# Patient Record
Sex: MALE | Race: WHITE | NOT HISPANIC OR LATINO | Employment: FULL TIME | URBAN - METROPOLITAN AREA
[De-identification: names, ages, dates, MRNs, and addresses within clinical notes are randomized per-mention and may not be internally consistent; named-entity substitution may affect disease eponyms.]

---

## 2017-01-12 ENCOUNTER — PATIENT MESSAGE (OUTPATIENT)
Dept: FAMILY MEDICINE | Facility: CLINIC | Age: 62
End: 2017-01-12

## 2017-03-06 ENCOUNTER — TELEPHONE (OUTPATIENT)
Dept: FAMILY MEDICINE | Facility: CLINIC | Age: 62
End: 2017-03-06

## 2017-03-06 ENCOUNTER — LAB VISIT (OUTPATIENT)
Dept: LAB | Facility: HOSPITAL | Age: 62
End: 2017-03-06
Attending: EMERGENCY MEDICINE
Payer: COMMERCIAL

## 2017-03-06 DIAGNOSIS — Z00.00 PREVENTATIVE HEALTH CARE: Primary | ICD-10-CM

## 2017-03-06 DIAGNOSIS — Z00.00 PREVENTATIVE HEALTH CARE: ICD-10-CM

## 2017-03-06 LAB
ALBUMIN SERPL BCP-MCNC: 4 G/DL
ALP SERPL-CCNC: 72 U/L
ALT SERPL W/O P-5'-P-CCNC: 40 U/L
ANION GAP SERPL CALC-SCNC: 8 MMOL/L
AST SERPL-CCNC: 26 U/L
BILIRUB SERPL-MCNC: 0.7 MG/DL
BUN SERPL-MCNC: 15 MG/DL
CALCIUM SERPL-MCNC: 9.2 MG/DL
CHLORIDE SERPL-SCNC: 102 MMOL/L
CHOLEST/HDLC SERPL: 5.3 {RATIO}
CO2 SERPL-SCNC: 27 MMOL/L
COMPLEXED PSA SERPL-MCNC: 1.4 NG/ML
CREAT SERPL-MCNC: 1 MG/DL
EST. GFR  (AFRICAN AMERICAN): >60 ML/MIN/1.73 M^2
EST. GFR  (NON AFRICAN AMERICAN): >60 ML/MIN/1.73 M^2
GLUCOSE SERPL-MCNC: 94 MG/DL
HDL/CHOLESTEROL RATIO: 19 %
HDLC SERPL-MCNC: 253 MG/DL
HDLC SERPL-MCNC: 48 MG/DL
LDLC SERPL CALC-MCNC: 172.2 MG/DL
NONHDLC SERPL-MCNC: 205 MG/DL
POTASSIUM SERPL-SCNC: 4 MMOL/L
PROT SERPL-MCNC: 7.4 G/DL
SODIUM SERPL-SCNC: 137 MMOL/L
TRIGL SERPL-MCNC: 164 MG/DL

## 2017-03-06 PROCEDURE — 80053 COMPREHEN METABOLIC PANEL: CPT

## 2017-03-06 PROCEDURE — 80061 LIPID PANEL: CPT

## 2017-03-06 PROCEDURE — 36415 COLL VENOUS BLD VENIPUNCTURE: CPT | Mod: PO

## 2017-03-06 PROCEDURE — 84153 ASSAY OF PSA TOTAL: CPT

## 2017-03-06 NOTE — TELEPHONE ENCOUNTER
----- Message from Emily Grande sent at 3/6/2017  2:07 PM CST -----  Patient states that he has an appointment/annual scheduled for tomorrow and need orders for blood work.  Please call patient at [phone].

## 2017-03-06 NOTE — TELEPHONE ENCOUNTER
Pt is in clinic and requesting to get lab work done today for his appointment tomorrow. Annual physical.

## 2017-03-07 ENCOUNTER — OFFICE VISIT (OUTPATIENT)
Dept: FAMILY MEDICINE | Facility: CLINIC | Age: 62
End: 2017-03-07
Payer: COMMERCIAL

## 2017-03-07 VITALS
WEIGHT: 245.56 LBS | OXYGEN SATURATION: 96 % | HEIGHT: 75 IN | DIASTOLIC BLOOD PRESSURE: 102 MMHG | SYSTOLIC BLOOD PRESSURE: 106 MMHG | BODY MASS INDEX: 30.53 KG/M2 | HEART RATE: 78 BPM | RESPIRATION RATE: 18 BRPM

## 2017-03-07 DIAGNOSIS — E66.3 OVERWEIGHT: ICD-10-CM

## 2017-03-07 DIAGNOSIS — R41.840 ATTENTION DEFICIT: ICD-10-CM

## 2017-03-07 DIAGNOSIS — Z00.00 PREVENTATIVE HEALTH CARE: Primary | ICD-10-CM

## 2017-03-07 DIAGNOSIS — R03.0 ELEVATED BLOOD PRESSURE READING: ICD-10-CM

## 2017-03-07 PROCEDURE — 99999 PR PBB SHADOW E&M-EST. PATIENT-LVL III: CPT | Mod: PBBFAC,,, | Performed by: EMERGENCY MEDICINE

## 2017-03-07 PROCEDURE — 99396 PREV VISIT EST AGE 40-64: CPT | Mod: S$GLB,,, | Performed by: EMERGENCY MEDICINE

## 2017-03-07 NOTE — PROGRESS NOTES
Subjective:   THIS NOTE IS DONE WITH VOICE RECOGNITION        Patient ID: Mike Ybarra is a 61 y.o. male.    [facility]      HPI    Sampson in for his annual health maintenance.  He's been doing quite well.  He does have concerns about his blood work.    He is getting significant exercise, most of this in the form of yard work in construction around his home.  They have relocated, and are currently living in a home with his mother-in-law.  This is working out well.    He is concerned about his inability to lose weight.  At one point he was using Adderall to control ADD-like symptoms, and he felt better from that perspective but he also noticed significant weight loss.  He has tried a variety of health food products and attempt to lose weight.  He has not been successful.  He is currently using an over-the-counter testosterone supplementation, this to address weight and energy levels.  This may or may not be successful for him.  It is not successful for weight loss.    In regards to his ADD treatment, he would like to reinstitute this.  Given his blood pressures today I do not think this is a good idea, but we will certainly follow his blood pressure along, and if it stabilizes at a normal level this could be an option for him.    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  He continues to enjoy his job.    He is not aware that he has had high blood pressure.  This was discussed today.  Repeated blood pressure readings during this visit remained elevated.    In regards to hepatitis C, he has donated blood within the past 2 years.  There was no restriction placed on his blood donations.  At that time he did have routine testing completed through the blood bank.    He has not had any recent surgeries, emergency room encounters, or inpatient stays.    The Valtrex has been helpful with the recurrent blisters that he gets on his lip.  Avoidance of  those behaviors a trigger blisters is encouraged.    No change in social history, surgical history, medical history, or family history.      There is no immunization history on file for this patient.     shingles vaccine discussed and encouraged.  He will check with his insurance carrier.      Current Outpatient Prescriptions   Medication Sig Dispense Refill    tadalafil (CIALIS) 20 MG Tab Take 1 tablet (20 mg total) by mouth once daily. 5 tablet 6    valacyclovir (VALTREX) 1000 MG tablet Take 1 tablet (1,000 mg total) by mouth 2 (two) times daily. 4 tablet 0     No current facility-administered medications for this visit.      Patient entered via patient portal and augmented by myself.    Review of Systems   Constitutional: Negative for activity change and unexpected weight change.   HENT: Negative for hearing loss, rhinorrhea and trouble swallowing.    Eyes: Negative for discharge and visual disturbance.   Respiratory: Negative for chest tightness and wheezing.    Cardiovascular: Negative for chest pain and palpitations.   Gastrointestinal: Negative for blood in stool, constipation, diarrhea and vomiting.   Endocrine: Negative for polydipsia and polyuria.   Genitourinary: Negative for difficulty urinating, hematuria and urgency.   Musculoskeletal: Negative for arthralgias and joint swelling.   Skin: Negative for rash and wound.   Neurological: Negative for weakness and headaches.   Psychiatric/Behavioral: Negative for confusion and dysphoric mood.       Objective:      Physical Exam   Constitutional: He is oriented to person, place, and time. He appears well-developed and well-nourished. No distress.   HENT:   Head: Normocephalic and atraumatic.   Right Ear: External ear normal.   Left Ear: External ear normal.   Nose: Nose normal.   Mouth/Throat: Oropharynx is clear and moist. No oropharyngeal exudate.   Eyes: Conjunctivae and EOM are normal. Pupils are equal, round, and reactive to light. No scleral icterus.    Neck: Normal range of motion. Neck supple. No thyromegaly present.   Cardiovascular: Normal rate, regular rhythm and normal heart sounds.    Pulmonary/Chest: Effort normal and breath sounds normal. He has no wheezes. He has no rales.   Abdominal: Soft. Bowel sounds are normal. He exhibits no distension. There is no tenderness.   Musculoskeletal: Normal range of motion. He exhibits no edema or tenderness.   Lymphadenopathy:     He has no cervical adenopathy.   Neurological: He is alert and oriented to person, place, and time. He has normal reflexes. He exhibits normal muscle tone. Coordination normal.   Skin: Skin is warm and dry. No rash noted.   Psychiatric: He has a normal mood and affect. His behavior is normal.   Vitals reviewed.      Assessment:       1. Preventative health care    2. Elevated blood pressure reading    3. Attention deficit    4. Overweight        Plan:       1. Preventative health care  Comprehensive metabolic panel reviewed: Stable  Cholesterol panel reviewed, increased, consistent with weight.  Routine cancer screening in place      2. Elevated blood pressure reading  New  Multiple readings, in both arms reveals symmetry, but persistent elevation.  Additional follow-up recommended, in short order.    3. Attention deficit  Possible  Would not recommend any type of intervention unless his blood pressure is absolutely controlled.    4. Overweight  No recommended pharmacological intervention.  More focused on self regulation of portion size and choice of foods.    Results for BRYANNA DUDLEY BIBI (MRN [id number]) as of 3/8/2017 20:00   Ref. Range 3/6/2017 14:37   Cholesterol Latest Ref Range: 120 - 199 mg/dL 253 (H)   HDL Latest Ref Range: 40 - 75 mg/dL 48   LDL Cholesterol Latest Ref Range: 63.0 - 159.0 mg/dL 172.2 (H)   Total Cholesterol/HDL Ratio Latest Ref Range: 2.0 - 5.0  5.3 (H)   Triglycerides Latest Ref Range: 30 - 150 mg/dL 164 (H)     Prior to medication intervention, he is  requesting a couple months to address his weight loss and then repeat this.    We did make arrangements for him to be seen in the next few days with repeat blood pressures obtained.  He'll also bring his blood pressure cuff for validation/calibration.

## 2017-03-09 NOTE — PATIENT INSTRUCTIONS
Elijah,    Please be sure to come back to visit us with your blood pressure cuff for additional blood pressure readings.  If your blood pressure remains this high, we will want to treat it.    I'm fine with you taking a couple of months to see how you do with weight loss and repeating your cholesterol profile.  Please REM her that your cholesterol profile has increased since last time we monitored.    I do not think is in your best interest to start any medications like Adderall with your blood pressure elevated.  Perhaps when this is controlled i.e. her blood pressure, we can then address the attention problems you're experiencing.    TAMERA

## 2017-03-14 ENCOUNTER — TELEPHONE (OUTPATIENT)
Dept: FAMILY MEDICINE | Facility: CLINIC | Age: 62
End: 2017-03-14

## 2017-03-14 DIAGNOSIS — I10 ESSENTIAL HYPERTENSION: Primary | ICD-10-CM

## 2017-03-14 RX ORDER — LISINOPRIL 10 MG/1
10 TABLET ORAL DAILY
Qty: 30 TABLET | Refills: 3 | Status: SHIPPED | OUTPATIENT
Start: 2017-03-14 | End: 2017-07-13 | Stop reason: SDUPTHER

## 2017-03-14 NOTE — TELEPHONE ENCOUNTER
Blood pressure not controlled.     Recommended:  1.  Get a blood pressure cuff for home monitoring.  2.  Start lisinopril 10 mg per day  3.  Patient information sent to patient's email at his request.  4.  Blood pressure check (nursing visit) in two weeks.  5.  Repeat basic metabolic panel at the time of upcoming nursing visit.    TAMERA

## 2017-03-28 ENCOUNTER — LAB VISIT (OUTPATIENT)
Dept: LAB | Facility: HOSPITAL | Age: 62
End: 2017-03-28
Attending: EMERGENCY MEDICINE
Payer: COMMERCIAL

## 2017-03-28 ENCOUNTER — CLINICAL SUPPORT (OUTPATIENT)
Dept: FAMILY MEDICINE | Facility: CLINIC | Age: 62
End: 2017-03-28
Payer: COMMERCIAL

## 2017-03-28 VITALS — DIASTOLIC BLOOD PRESSURE: 82 MMHG | HEART RATE: 85 BPM | SYSTOLIC BLOOD PRESSURE: 134 MMHG

## 2017-03-28 DIAGNOSIS — I10 ESSENTIAL HYPERTENSION: ICD-10-CM

## 2017-03-28 LAB
ANION GAP SERPL CALC-SCNC: 7 MMOL/L
BUN SERPL-MCNC: 7 MG/DL
CALCIUM SERPL-MCNC: 9.1 MG/DL
CHLORIDE SERPL-SCNC: 102 MMOL/L
CO2 SERPL-SCNC: 27 MMOL/L
CREAT SERPL-MCNC: 1 MG/DL
EST. GFR  (AFRICAN AMERICAN): >60 ML/MIN/1.73 M^2
EST. GFR  (NON AFRICAN AMERICAN): >60 ML/MIN/1.73 M^2
GLUCOSE SERPL-MCNC: 99 MG/DL
POTASSIUM SERPL-SCNC: 3.9 MMOL/L
SODIUM SERPL-SCNC: 136 MMOL/L

## 2017-03-28 PROCEDURE — 99999 PR PBB SHADOW E&M-EST. PATIENT-LVL I: CPT | Mod: PBBFAC,,,

## 2017-03-28 PROCEDURE — 80048 BASIC METABOLIC PNL TOTAL CA: CPT

## 2017-03-28 PROCEDURE — 36415 COLL VENOUS BLD VENIPUNCTURE: CPT | Mod: PO

## 2017-04-28 ENCOUNTER — TELEPHONE (OUTPATIENT)
Dept: FAMILY MEDICINE | Facility: CLINIC | Age: 62
End: 2017-04-28

## 2017-04-28 NOTE — TELEPHONE ENCOUNTER
----- Message from Emilyjaci Grande sent at 4/28/2017 11:37 AM CDT -----  Patient states that he is having calf issues and need to speak you today.  Please call patient at [phone].

## 2017-04-28 NOTE — TELEPHONE ENCOUNTER
Spoke with pt he has a rash on the back of his leg that he would like for someone to look at. He is concerned about this, it has been on his leg for about two weeks and has not went away he will come in on Monday to see aleks.

## 2017-05-01 ENCOUNTER — OFFICE VISIT (OUTPATIENT)
Dept: FAMILY MEDICINE | Facility: CLINIC | Age: 62
End: 2017-05-01
Payer: COMMERCIAL

## 2017-05-01 VITALS
DIASTOLIC BLOOD PRESSURE: 82 MMHG | RESPIRATION RATE: 18 BRPM | TEMPERATURE: 98 F | BODY MASS INDEX: 30.48 KG/M2 | HEIGHT: 75 IN | SYSTOLIC BLOOD PRESSURE: 132 MMHG | WEIGHT: 245.13 LBS

## 2017-05-01 DIAGNOSIS — J30.2 SEASONAL ALLERGIC RHINITIS, UNSPECIFIED ALLERGIC RHINITIS TRIGGER: Primary | ICD-10-CM

## 2017-05-01 DIAGNOSIS — L98.9 SKIN LESION: ICD-10-CM

## 2017-05-01 PROCEDURE — 3079F DIAST BP 80-89 MM HG: CPT | Mod: S$GLB,,, | Performed by: NURSE PRACTITIONER

## 2017-05-01 PROCEDURE — 1160F RVW MEDS BY RX/DR IN RCRD: CPT | Mod: S$GLB,,, | Performed by: NURSE PRACTITIONER

## 2017-05-01 PROCEDURE — 99999 PR PBB SHADOW E&M-EST. PATIENT-LVL III: CPT | Mod: PBBFAC,,, | Performed by: NURSE PRACTITIONER

## 2017-05-01 PROCEDURE — 99213 OFFICE O/P EST LOW 20 MIN: CPT | Mod: S$GLB,,, | Performed by: NURSE PRACTITIONER

## 2017-05-01 PROCEDURE — 3075F SYST BP GE 130 - 139MM HG: CPT | Mod: S$GLB,,, | Performed by: NURSE PRACTITIONER

## 2017-05-01 RX ORDER — MOMETASONE FUROATE 50 UG/1
2 SPRAY, METERED NASAL DAILY
Qty: 17 G | Refills: 11 | Status: SHIPPED | OUTPATIENT
Start: 2017-05-01 | End: 2017-05-01 | Stop reason: SDUPTHER

## 2017-05-01 RX ORDER — LEVOCETIRIZINE DIHYDROCHLORIDE 5 MG/1
5 TABLET, FILM COATED ORAL NIGHTLY
Qty: 30 TABLET | Refills: 11 | Status: SHIPPED | OUTPATIENT
Start: 2017-05-01 | End: 2019-08-01

## 2017-05-01 RX ORDER — MOMETASONE FUROATE 50 UG/1
2 SPRAY, METERED NASAL DAILY
Qty: 17 G | Refills: 11 | Status: SHIPPED | OUTPATIENT
Start: 2017-05-01 | End: 2017-07-12

## 2017-05-01 RX ORDER — LEVOCETIRIZINE DIHYDROCHLORIDE 5 MG/1
5 TABLET, FILM COATED ORAL NIGHTLY
Qty: 30 TABLET | Refills: 11 | Status: SHIPPED | OUTPATIENT
Start: 2017-05-01 | End: 2017-05-01 | Stop reason: SDUPTHER

## 2017-05-01 NOTE — PROGRESS NOTES
Subjective:       Patient ID: Mike Ybarra is a 62 y.o. male.    Chief Complaint: Rash (left calf )    HPI Comments: Patient says he has taken zyrtec d over the last few days, his Bp is up somewhat today. On recheck manual BP is within normal limits. He is taking lisinopril 10 mg daily for HTN. He would like something to take for chronic intermittent seasonal allergies that will not affect his BP.  He says that he noticed a discoloration, raised area on back of calf a couple of weeks ago. He has not seen a dermatologist in many years. Has had a lot of sun exposure. He does not know how long it has been there or if it has changed, he is quite concerned about skin cancer and has several other moles he would like to have checked by dermatology.     TETANUS VACCINE due on 04/22/1974  Zoster Vaccine due on 04/22/2016    Past Medical History:  Past Medical History:  No date: Hyperlipemia  No date: Internal hemorrhoids  No date: Lumbar disc disease  Past Surgical History:  11/15/2006  Raul: COLONOSCOPY      Comment: Internal small hemorrhoids were found.                 Non-thrombosed external hemorrhoids. Perianal                skin tag.  Redundant left colon.   12/15/2006  [facility]: HEMORRHOID SURGERY  No date: HERNIA REPAIR      Comment: umbilical  No date: TONSILLECTOMY, ADENOIDECTOMY  Review of patient's allergies indicates:   -- Avocado     --  Other reaction(s): Nausea             Other reaction(s): Vomiting   -- Avocado (laurus persea)     --  Inside felt very sick  Current Outpatient Prescriptions on File Prior to Visit:  lisinopril 10 MG tablet, Take 1 tablet (10 mg total) by mouth once daily., Disp: 30 tablet, Rfl: 3  tadalafil (CIALIS) 20 MG Tab, Take 1 tablet (20 mg total) by mouth once daily., Disp: 5 tablet, Rfl: 6  valacyclovir (VALTREX) 1000 MG tablet, Take 1 tablet (1,000 mg total) by mouth 2 (two) times daily., Disp: 4 tablet, Rfl: 0    No current facility-administered medications on file prior  to visit.     Social History    Marital status:              Spouse name:                       Years of education:                 Number of children: 5             Occupational History  Occupation          Employer            Comment               educator                                                      [employer]     Social History Main Topics    Smoking status: Former Smoker                                                                Packs/day: 0.00      Years: 0.00           Quit date: 4/14/1998    Smokeless status: Never Used                        Alcohol use: Yes                Comment: rare    Drug use: No              Sexual activity: Yes               Partners with: Female    Other Topics            Concern    None on file    Social History Narrative    Significant yard work.  No formal exercise      Review of patient's family history indicates:    Cancer                         Father                      Comment: prostate    Gout                           Brother                   Cancer                         Brother                     Comment: Skin    Heart attack                   Paternal Grandfather             Review of Systems   Constitutional: Negative for activity change and unexpected weight change.   HENT: Positive for postnasal drip and rhinorrhea. Negative for hearing loss and trouble swallowing.    Eyes: Negative for discharge and visual disturbance.   Respiratory: Negative for chest tightness and wheezing.    Cardiovascular: Negative for chest pain and palpitations.   Gastrointestinal: Negative for blood in stool, constipation, diarrhea and vomiting.   Endocrine: Negative for polydipsia and polyuria.   Genitourinary: Negative for difficulty urinating, hematuria and urgency.   Musculoskeletal: Negative for arthralgias and joint swelling.   Skin: Positive for color change. Negative for pallor, rash and wound.   Neurological: Negative for weakness and headaches.    Psychiatric/Behavioral: Negative for confusion and dysphoric mood.       Objective:      Physical Exam   Cardiovascular: Normal rate.    Pulmonary/Chest: Effort normal.   Abdominal: Soft.   Skin:            Assessment:       1. Seasonal allergic rhinitis, unspecified allergic rhinitis trigger    2. Skin lesion        Plan:       1. Seasonal allergic rhinitis, unspecified allergic rhinitis trigger  Stop all decongestants. Monitor Bp daily. Start xyzal and nasonex prn. Follow up if not effective.  - levocetirizine (XYZAL) 5 MG tablet; Take 1 tablet (5 mg total) by mouth every evening.  Dispense: 30 tablet; Refill: 11  - mometasone (NASONEX) 50 mcg/actuation nasal spray; 2 sprays by Nasal route once daily.  Dispense: 17 g; Refill: 11    2. Skin lesion  Reassurance provided regarding current skin lesion, but referral placed for patient to have comprehensive skin check with dermatology due to fair skin, chronic sun exposure, and multiple nevi.  - Ambulatory referral to Dermatology

## 2017-05-30 ENCOUNTER — TELEPHONE (OUTPATIENT)
Dept: DERMATOLOGY | Facility: CLINIC | Age: 62
End: 2017-05-30

## 2017-07-12 ENCOUNTER — OFFICE VISIT (OUTPATIENT)
Dept: DERMATOLOGY | Facility: CLINIC | Age: 62
End: 2017-07-12
Payer: COMMERCIAL

## 2017-07-12 VITALS — WEIGHT: 245 LBS | BODY MASS INDEX: 30.46 KG/M2 | RESPIRATION RATE: 18 BRPM | HEIGHT: 75 IN

## 2017-07-12 DIAGNOSIS — Z80.8 FAMILY HISTORY OF MELANOMA: Primary | ICD-10-CM

## 2017-07-12 DIAGNOSIS — D22.9 MULTIPLE BENIGN NEVI: ICD-10-CM

## 2017-07-12 DIAGNOSIS — Z12.83 SKIN CANCER SCREENING: ICD-10-CM

## 2017-07-12 DIAGNOSIS — L82.1 SEBORRHEIC KERATOSES: ICD-10-CM

## 2017-07-12 PROCEDURE — 99203 OFFICE O/P NEW LOW 30 MIN: CPT | Mod: S$GLB,,, | Performed by: DERMATOLOGY

## 2017-07-12 PROCEDURE — 99999 PR PBB SHADOW E&M-EST. PATIENT-LVL II: CPT | Mod: PBBFAC,,, | Performed by: DERMATOLOGY

## 2017-07-12 NOTE — PROGRESS NOTES
Subjective:       Patient ID:  Mike Ybarra is a 62 y.o. male who presents for   Chief Complaint   Patient presents with    Skin Check     Intial visit for skin check   Pt denies any new or changing lesions or moles      Phx AK' s - - cryo tx   Father had melanoma, prostate cancer  Brother had skin ca - type       Growth on  Left calf, raised, brown, months, no tx.       Review of Systems   Skin: Positive for activity-related sunscreen use. Negative for itching, dry skin, daily sunscreen use, sensitivity to antibiotic ointment, sensitivity to bandage adhesive, tendency to form keloidal scars and wears hat.   Hematologic/Lymphatic: Does not bruise/bleed easily.        Objective:    Physical Exam   Skin:                      Diagram Legend     Erythematous scaling macule/papule c/w actinic keratosis       Vascular papule c/w angioma      Pigmented verrucoid papule/plaque c/w seborrheic keratosis      Yellow umbilicated papule c/w sebaceous hyperplasia      Irregularly shaped tan macule c/w lentigo     1-2 mm smooth white papules consistent with Milia      Movable subcutaneous cyst with punctum c/w epidermal inclusion cyst      Subcutaneous movable cyst c/w pilar cyst      Firm pink to brown papule c/w dermatofibroma      Pedunculated fleshy papule(s) c/w skin tag(s)      Evenly pigmented macule c/w junctional nevus     Mildly variegated pigmented, slightly irregular-bordered macule c/w mildly atypical nevus      Flesh colored to evenly pigmented papule c/w intradermal nevus       Pink pearly papule/plaque c/w basal cell carcinoma      Erythematous hyperkeratotic cursted plaque c/w SCC      Surgical scar with no sign of skin cancer recurrence      Open and closed comedones      Inflammatory papules and pustules      Verrucoid papule consistent consistent with wart     Erythematous eczematous patches and plaques     Dystrophic onycholytic nail with subungual debris c/w onychomycosis     Umbilicated  papule    Erythematous-base heme-crusted tan verrucoid plaque consistent with inflamed seborrheic keratosis     Erythematous Silvery Scaling Plaque c/w Psoriasis     See annotation      Assessment / Plan:        Family history of melanoma  Total body skin examination performed today including at least 12 points as noted in physical examination. No lesions suspicious for malignancy noted.      Skin cancer screening      Total body skin examination performed today including at least 12 points as noted in physical examination. No lesions suspicious for malignancy noted.    Seborrheic keratoses, extremities  These are benign inherited growths without a malignant potential. Reassurance given to patient. No treatment is necessary.       Multiple benign nevi, trunk and extremities  Discussed ABCDE's of nevi.  Monitor for new mole or moles that are becoming bigger, darker, irritated, or developing irregular borders.                Return in about 1 year (around 7/12/2018).

## 2017-07-12 NOTE — PROGRESS NOTES
Subjective:       Patient ID:  Mike Ybarra is a 62 y.o. male who presents for   Chief Complaint   Patient presents with    Follow-up     HPI    Review of Systems   Skin: Positive for dry skin.   Hematologic/Lymphatic: Bruises/bleeds easily.        Objective:    Physical Exam       Diagram Legend     Erythematous scaling macule/papule c/w actinic keratosis       Vascular papule c/w angioma      Pigmented verrucoid papule/plaque c/w seborrheic keratosis      Yellow umbilicated papule c/w sebaceous hyperplasia      Irregularly shaped tan macule c/w lentigo     1-2 mm smooth white papules consistent with Milia      Movable subcutaneous cyst with punctum c/w epidermal inclusion cyst      Subcutaneous movable cyst c/w pilar cyst      Firm pink to brown papule c/w dermatofibroma      Pedunculated fleshy papule(s) c/w skin tag(s)      Evenly pigmented macule c/w junctional nevus     Mildly variegated pigmented, slightly irregular-bordered macule c/w mildly atypical nevus      Flesh colored to evenly pigmented papule c/w intradermal nevus       Pink pearly papule/plaque c/w basal cell carcinoma      Erythematous hyperkeratotic cursted plaque c/w SCC      Surgical scar with no sign of skin cancer recurrence      Open and closed comedones      Inflammatory papules and pustules      Verrucoid papule consistent consistent with wart     Erythematous eczematous patches and plaques     Dystrophic onycholytic nail with subungual debris c/w onychomycosis     Umbilicated papule    Erythematous-base heme-crusted tan verrucoid plaque consistent with inflamed seborrheic keratosis     Erythematous Silvery Scaling Plaque c/w Psoriasis     See annotation      Assessment / Plan:        There are no diagnoses linked to this encounter.         No Follow-up on file.

## 2017-07-13 DIAGNOSIS — I10 ESSENTIAL HYPERTENSION: ICD-10-CM

## 2017-07-14 RX ORDER — LISINOPRIL 10 MG/1
TABLET ORAL
Qty: 30 TABLET | Refills: 0 | Status: SHIPPED | OUTPATIENT
Start: 2017-07-14 | End: 2017-08-21 | Stop reason: SDUPTHER

## 2017-08-21 DIAGNOSIS — I10 ESSENTIAL HYPERTENSION: ICD-10-CM

## 2017-08-21 RX ORDER — LISINOPRIL 10 MG/1
TABLET ORAL
Qty: 30 TABLET | Refills: 0 | Status: SHIPPED | OUTPATIENT
Start: 2017-08-21 | End: 2017-09-20 | Stop reason: SDUPTHER

## 2017-09-20 DIAGNOSIS — I10 ESSENTIAL HYPERTENSION: ICD-10-CM

## 2017-09-20 RX ORDER — LISINOPRIL 10 MG/1
TABLET ORAL
Qty: 30 TABLET | Refills: 6 | Status: SHIPPED | OUTPATIENT
Start: 2017-09-20 | End: 2017-11-19 | Stop reason: SDUPTHER

## 2017-09-26 ENCOUNTER — OFFICE VISIT (OUTPATIENT)
Dept: DERMATOLOGY | Facility: CLINIC | Age: 62
End: 2017-09-26
Payer: COMMERCIAL

## 2017-09-26 VITALS — WEIGHT: 245 LBS | BODY MASS INDEX: 30.46 KG/M2 | HEIGHT: 75 IN

## 2017-09-26 DIAGNOSIS — L57.0 ACTINIC KERATOSIS: ICD-10-CM

## 2017-09-26 DIAGNOSIS — D48.5 NEOPLASM OF UNCERTAIN BEHAVIOR OF SKIN: Primary | ICD-10-CM

## 2017-09-26 PROCEDURE — 88305 TISSUE EXAM BY PATHOLOGIST: CPT | Mod: 26,,, | Performed by: PATHOLOGY

## 2017-09-26 PROCEDURE — 99999 PR PBB SHADOW E&M-EST. PATIENT-LVL III: CPT | Mod: PBBFAC,,, | Performed by: DERMATOLOGY

## 2017-09-26 PROCEDURE — 17003 DESTRUCT PREMALG LES 2-14: CPT | Mod: S$GLB,,, | Performed by: DERMATOLOGY

## 2017-09-26 PROCEDURE — 17000 DESTRUCT PREMALG LESION: CPT | Mod: S$GLB,,, | Performed by: DERMATOLOGY

## 2017-09-26 PROCEDURE — 88305 TISSUE EXAM BY PATHOLOGIST: CPT | Performed by: PATHOLOGY

## 2017-09-26 PROCEDURE — 11100 PR BIOPSY OF SKIN LESION: CPT | Mod: 59,S$GLB,, | Performed by: DERMATOLOGY

## 2017-09-26 PROCEDURE — 99499 UNLISTED E&M SERVICE: CPT | Mod: S$GLB,,, | Performed by: DERMATOLOGY

## 2017-09-26 NOTE — PROGRESS NOTES
Subjective:       Patient ID:  Mike Ybarra is a 62 y.o. male who presents for No chief complaint on file.    Presents today with complaint of lesion to right forearm for at least 6 months. Lesion is tender and very worrisome and causes anxiety.  Request removal. Treated it by burning it, wont resolve       Phx AK' s - - cryo tx   Father had melanoma, prostate cancer  Brother had skin ca - type        Review of Systems   Constitutional: Negative for weight loss, weight gain and fatigue.   Skin: Negative for daily sunscreen use, activity-related sunscreen use and wears hat.   Hematologic/Lymphatic: Does not bruise/bleed easily.        Objective:    Physical Exam   Skin:                     Diagram Legend     Erythematous scaling macule/papule c/w actinic keratosis       Vascular papule c/w angioma      Pigmented verrucoid papule/plaque c/w seborrheic keratosis      Yellow umbilicated papule c/w sebaceous hyperplasia      Irregularly shaped tan macule c/w lentigo     1-2 mm smooth white papules consistent with Milia      Movable subcutaneous cyst with punctum c/w epidermal inclusion cyst      Subcutaneous movable cyst c/w pilar cyst      Firm pink to brown papule c/w dermatofibroma      Pedunculated fleshy papule(s) c/w skin tag(s)      Evenly pigmented macule c/w junctional nevus     Mildly variegated pigmented, slightly irregular-bordered macule c/w mildly atypical nevus      Flesh colored to evenly pigmented papule c/w intradermal nevus       Pink pearly papule/plaque c/w basal cell carcinoma      Erythematous hyperkeratotic cursted plaque c/w SCC      Surgical scar with no sign of skin cancer recurrence      Open and closed comedones      Inflammatory papules and pustules      Verrucoid papule consistent consistent with wart     Erythematous eczematous patches and plaques     Dystrophic onycholytic nail with subungual debris c/w onychomycosis     Umbilicated papule    Erythematous-base heme-crusted  tan verrucoid plaque consistent with inflamed seborrheic keratosis     Erythematous Silvery Scaling Plaque c/w Psoriasis     See annotation      Assessment / Plan:      Pathology Orders:      Normal Orders This Visit    Tissue Specimen To Pathology, Dermatology     Questions:    Directional Terms:  Other(comment)    Clinical information:  scc vs isk vs other    Specific Site:  right forearm        Neoplasm of uncertain behavior of skin  -     Tissue Specimen To Pathology, Dermatology  Shave biopsy procedure note:    Shave biopsy performed after verbal consent including risk of infection, scar, recurrence, need for additional treatment of site. Area prepped with alcohol, anesthetized with approximately 1.0cc of 1% lidocaine with epinephrine. Lesional tissue shaved with razor blade. Hemostasis achieved with application of aluminum chloride followed by hyfrecation. No complications. Dressing applied. Wound care explained.        Actinic keratosis  Cryosurgery Procedure Note    Verbal consent from the patient is obtained and the patient is aware of the precancerous quality and need for treatment of these lesions. Liquid nitrogen cryosurgery is applied to the 2 actinic keratoses, as detailed in the physical exam, to produce a freeze injury. The patient is aware that blisters may form and is instructed on wound care with gentle cleansing and use of vaseline ointment to keep moist until healed. The patient is supplied a handout on cryosurgery and is instructed to call if lesions do not completely resolve. Discussed risk postinflammatory pigmentary changes.                Return in about 6 months (around 3/26/2018).

## 2017-10-02 ENCOUNTER — PATIENT MESSAGE (OUTPATIENT)
Dept: DERMATOLOGY | Facility: CLINIC | Age: 62
End: 2017-10-02

## 2017-10-03 ENCOUNTER — PATIENT MESSAGE (OUTPATIENT)
Dept: DERMATOLOGY | Facility: CLINIC | Age: 62
End: 2017-10-03

## 2017-10-03 ENCOUNTER — TELEPHONE (OUTPATIENT)
Dept: DERMATOLOGY | Facility: CLINIC | Age: 62
End: 2017-10-03

## 2017-10-03 NOTE — TELEPHONE ENCOUNTER
----- Message from Lily Phan sent at 10/3/2017 12:51 PM CDT -----  Contact: aaron Cassidys bioscopy results   Tavo Hardin MD   Fax    Call back

## 2017-10-05 ENCOUNTER — TELEPHONE (OUTPATIENT)
Dept: DERMATOLOGY | Facility: CLINIC | Age: 62
End: 2017-10-05

## 2017-10-05 NOTE — TELEPHONE ENCOUNTER
Pt contacted . Was seen by Dr Hardin a couple days ago. Mohs scheduled Oct24th w/ Dr Hardin. Pt thanked our office for calling & care he received. Advised pt to contact office with any needs. Pt requested to return for skin check in 4 months . Recall palced in Epic

## 2017-11-18 DIAGNOSIS — I10 ESSENTIAL HYPERTENSION: ICD-10-CM

## 2017-11-19 RX ORDER — LISINOPRIL 10 MG/1
TABLET ORAL
Qty: 90 TABLET | Refills: 2 | Status: SHIPPED | OUTPATIENT
Start: 2017-11-19 | End: 2018-08-26 | Stop reason: SDUPTHER

## 2018-01-02 ENCOUNTER — TELEPHONE (OUTPATIENT)
Dept: FAMILY MEDICINE | Facility: CLINIC | Age: 63
End: 2018-01-02

## 2018-01-02 DIAGNOSIS — M62.838 MUSCLE SPASM: Primary | ICD-10-CM

## 2018-01-02 NOTE — TELEPHONE ENCOUNTER
Spoke with pt he said he is back pain tweaked it like he did about 7 years ago.   [facility] [place]. He said he is doing some stretching exercises. Can you put the order in for him.

## 2018-01-02 NOTE — TELEPHONE ENCOUNTER
----- Message from Breanna Ray sent at 1/2/2018  2:42 PM CST -----  Contact: self  Patient called regarding a Rx for back pain. Stating had an issue with PT before and was prescribed Rx. Please contact [phone] (home)     [facility] [address] [place] [address] [id number] [place] [address] AT [facility] [address]  [address] [place] [id number]  Phone: [phone] Fax: [phone]

## 2018-01-03 ENCOUNTER — TELEPHONE (OUTPATIENT)
Dept: FAMILY MEDICINE | Facility: CLINIC | Age: 63
End: 2018-01-03

## 2018-01-03 NOTE — TELEPHONE ENCOUNTER
----- Message from Terrie Lin sent at 1/3/2018  1:54 PM CST -----  Patient again is calling to see if his physical therapy order has been approved or sent to the physical therapy. Please call back to advise at [phone].

## 2018-01-08 ENCOUNTER — OFFICE VISIT (OUTPATIENT)
Dept: FAMILY MEDICINE | Facility: CLINIC | Age: 63
End: 2018-01-08
Payer: COMMERCIAL

## 2018-01-08 VITALS
WEIGHT: 248.69 LBS | HEART RATE: 100 BPM | SYSTOLIC BLOOD PRESSURE: 120 MMHG | DIASTOLIC BLOOD PRESSURE: 78 MMHG | OXYGEN SATURATION: 98 % | RESPIRATION RATE: 18 BRPM | BODY MASS INDEX: 31.08 KG/M2

## 2018-01-08 DIAGNOSIS — M54.40 MIDLINE LOW BACK PAIN WITH SCIATICA, SCIATICA LATERALITY UNSPECIFIED, UNSPECIFIED CHRONICITY: ICD-10-CM

## 2018-01-08 DIAGNOSIS — M62.830 LUMBAR PARASPINAL MUSCLE SPASM: Primary | ICD-10-CM

## 2018-01-08 PROCEDURE — 99999 PR PBB SHADOW E&M-EST. PATIENT-LVL III: CPT | Mod: PBBFAC,,, | Performed by: PHYSICIAN ASSISTANT

## 2018-01-08 PROCEDURE — 99213 OFFICE O/P EST LOW 20 MIN: CPT | Mod: S$GLB,,, | Performed by: PHYSICIAN ASSISTANT

## 2018-01-08 RX ORDER — TIZANIDINE 2 MG/1
2 TABLET ORAL EVERY 8 HOURS PRN
Qty: 30 TABLET | Refills: 0 | Status: SHIPPED | OUTPATIENT
Start: 2018-01-08 | End: 2018-01-08 | Stop reason: CLARIF

## 2018-01-08 RX ORDER — TIZANIDINE 2 MG/1
2 TABLET ORAL EVERY 8 HOURS PRN
Qty: 30 TABLET | Refills: 0 | Status: SHIPPED | OUTPATIENT
Start: 2018-01-08 | End: 2018-01-18

## 2018-01-08 RX ORDER — MECLIZINE HYDROCHLORIDE 25 MG/1
25 TABLET ORAL 3 TIMES DAILY PRN
Qty: 30 TABLET | Refills: 2 | Status: SHIPPED | OUTPATIENT
Start: 2018-01-08 | End: 2018-01-09 | Stop reason: CLARIF

## 2018-01-08 RX ORDER — METHYLPREDNISOLONE 4 MG/1
TABLET ORAL
Qty: 1 PACKAGE | Refills: 0 | Status: SHIPPED | OUTPATIENT
Start: 2018-01-08 | End: 2018-01-08

## 2018-01-08 NOTE — PROGRESS NOTES
Subjective:       Patient ID: Mike Ybarra is a 62 y.o. male.    Chief Complaint: Back Pain (since Dec 29)    HPI   Felt pop on back prior to onset  Spasm with some R leg discomfort   Does not feel sciatic  Pt has been in PT  Not helping much  Review of Systems   Constitutional: Positive for activity change. Negative for appetite change, chills, diaphoresis, fatigue, fever and unexpected weight change.   HENT: Negative.    Eyes: Negative.    Respiratory: Negative.    Cardiovascular: Negative.    Gastrointestinal: Negative.    Endocrine: Negative.    Genitourinary: Negative.    Musculoskeletal: Positive for back pain, gait problem and myalgias. Negative for arthralgias, joint swelling, neck pain and neck stiffness.   Skin: Negative.  Negative for rash.       Objective:      Physical Exam   Constitutional: He is oriented to person, place, and time. He appears well-developed and well-nourished. No distress.   HENT:   Head: Normocephalic and atraumatic.   Eyes: Conjunctivae are normal. No scleral icterus.   Neck: Normal range of motion. Neck supple. No tracheal deviation present. No thyromegaly present.   Musculoskeletal: He exhibits tenderness. He exhibits no edema.   Tight and mildly tender lumbar paravertebral muscles  R SI joint area tenderness  Vertebral tenderness at the L4 &L5 area without radiculopathy  SLR's 85 degrees bilat without back pain   Lymphadenopathy:     He has no cervical adenopathy.   Neurological: He is alert and oriented to person, place, and time. He displays normal reflexes.   Skin: Skin is warm and dry. No rash noted.   Vitals reviewed.      Assessment:       1. Lumbar paraspinal muscle spasm    2. Midline low back pain with sciatica, sciatica laterality unspecified, unspecified chronicity        Plan:         Mike was seen today for back pain.    Diagnoses and all orders for this visit:    Lumbar paraspinal muscle spasm  -     Discontinue: tiZANidine (ZANAFLEX) 2 MG tablet; Take  1 tablet (2 mg total) by mouth every 8 (eight) hours as needed.  -     Discontinue: methylPREDNISolone (MEDROL DOSEPACK) 4 mg tablet; use as directed  -     tiZANidine (ZANAFLEX) 2 MG tablet; Take 1 tablet (2 mg total) by mouth every 8 (eight) hours as needed.  -     meclizine (ANTIVERT) 25 mg tablet; Take 1 tablet (25 mg total) by mouth 3 (three) times daily as needed.    Midline low back pain with sciatica, sciatica laterality unspecified, unspecified chronicity  -     Discontinue: tiZANidine (ZANAFLEX) 2 MG tablet; Take 1 tablet (2 mg total) by mouth every 8 (eight) hours as needed.  -     Discontinue: methylPREDNISolone (MEDROL DOSEPACK) 4 mg tablet; use as directed  -     tiZANidine (ZANAFLEX) 2 MG tablet; Take 1 tablet (2 mg total) by mouth every 8 (eight) hours as needed.  -     meclizine (ANTIVERT) 25 mg tablet; Take 1 tablet (25 mg total) by mouth 3 (three) times daily as needed.    discussed otc's   Discussed home PT  Back exercises sheet

## 2018-01-09 ENCOUNTER — TELEPHONE (OUTPATIENT)
Dept: INTERNAL MEDICINE | Facility: CLINIC | Age: 63
End: 2018-01-09

## 2018-01-09 RX ORDER — METHYLPREDNISOLONE 4 MG/1
TABLET ORAL
Qty: 1 PACKAGE | Refills: 0 | Status: SHIPPED | OUTPATIENT
Start: 2018-01-09 | End: 2018-05-07 | Stop reason: ALTCHOICE

## 2018-01-09 NOTE — TELEPHONE ENCOUNTER
Spoke to patient and patient states he is extreme pain and did not see a prescription for the steroid that you had discussed with him at his appointment yesterday. Please advise

## 2018-01-09 NOTE — TELEPHONE ENCOUNTER
----- Message from Samantha Pearson sent at 1/9/2018  8:36 AM CST -----  Contact: Mike  Patient is calling with question regarding Rx meclizine (ANTIVERT) 25 mg tablet as looked online and showed for vertigo. Asking if this is the steroid that was discussed during visit yesterday as did take yesterday but did not today until speaks to office. Please call [phone]. Thanks!

## 2018-04-11 ENCOUNTER — PATIENT MESSAGE (OUTPATIENT)
Dept: FAMILY MEDICINE | Facility: CLINIC | Age: 63
End: 2018-04-11

## 2018-05-07 ENCOUNTER — PATIENT MESSAGE (OUTPATIENT)
Dept: FAMILY MEDICINE | Facility: CLINIC | Age: 63
End: 2018-05-07

## 2018-05-07 ENCOUNTER — OFFICE VISIT (OUTPATIENT)
Dept: FAMILY MEDICINE | Facility: CLINIC | Age: 63
End: 2018-05-07
Payer: COMMERCIAL

## 2018-05-07 ENCOUNTER — PATIENT MESSAGE (OUTPATIENT)
Dept: PRIMARY CARE CLINIC | Facility: CLINIC | Age: 63
End: 2018-05-07

## 2018-05-07 ENCOUNTER — TELEPHONE (OUTPATIENT)
Dept: SURGERY | Facility: CLINIC | Age: 63
End: 2018-05-07

## 2018-05-07 VITALS
DIASTOLIC BLOOD PRESSURE: 90 MMHG | HEART RATE: 100 BPM | RESPIRATION RATE: 16 BRPM | HEIGHT: 75 IN | WEIGHT: 244.5 LBS | SYSTOLIC BLOOD PRESSURE: 126 MMHG | OXYGEN SATURATION: 96 % | BODY MASS INDEX: 30.4 KG/M2

## 2018-05-07 DIAGNOSIS — K64.5 HEMORRHOID THROMBOSIS: Primary | ICD-10-CM

## 2018-05-07 PROCEDURE — 3008F BODY MASS INDEX DOCD: CPT | Mod: CPTII,S$GLB,, | Performed by: PHYSICIAN ASSISTANT

## 2018-05-07 PROCEDURE — 3080F DIAST BP >= 90 MM HG: CPT | Mod: CPTII,S$GLB,, | Performed by: PHYSICIAN ASSISTANT

## 2018-05-07 PROCEDURE — 99999 PR PBB SHADOW E&M-EST. PATIENT-LVL IV: CPT | Mod: PBBFAC,,, | Performed by: PHYSICIAN ASSISTANT

## 2018-05-07 PROCEDURE — 99213 OFFICE O/P EST LOW 20 MIN: CPT | Mod: S$GLB,,, | Performed by: PHYSICIAN ASSISTANT

## 2018-05-07 PROCEDURE — 3074F SYST BP LT 130 MM HG: CPT | Mod: CPTII,S$GLB,, | Performed by: PHYSICIAN ASSISTANT

## 2018-05-07 RX ORDER — HYDROCORTISONE ACETATE 25 MG/1
25 SUPPOSITORY RECTAL 2 TIMES DAILY
Qty: 20 SUPPOSITORY | Refills: 0 | Status: SHIPPED | OUTPATIENT
Start: 2018-05-07 | End: 2018-05-07 | Stop reason: SDUPTHER

## 2018-05-07 RX ORDER — HYDROCORTISONE ACETATE 25 MG/1
25 SUPPOSITORY RECTAL 2 TIMES DAILY
Qty: 20 SUPPOSITORY | Refills: 0 | Status: SHIPPED | OUTPATIENT
Start: 2018-05-07 | End: 2018-05-17

## 2018-05-07 NOTE — TELEPHONE ENCOUNTER
----- Message from Lily Phan MA sent at 5/7/2018  4:43 PM CDT -----  Contact: eliane   Would like to be seen sooner than scheduled, 05 28 2018  Jason Dick [phone]

## 2018-05-07 NOTE — TELEPHONE ENCOUNTER
----- Message from Terrie Lin sent at 5/7/2018  1:58 PM CDT -----  Type:  Patient Returning Call    Who Called:  Patient  Who Left Message for Patient:  Ambar  Does the patient know what this is regarding?:  An appointment  Best Call Back Number:  [phone]

## 2018-05-07 NOTE — PROGRESS NOTES
Subjective:       Patient ID: Mike Ybarra is a 63 y.o. male.    Chief Complaint: Hemorrhoids (hemorrhoidectomy 2012)    HPI   Pt has rectal pain x 7 to 10 days  Hx of hemorrhoids   Review of Systems   Constitutional: Positive for activity change. Negative for appetite change, chills, diaphoresis, fatigue, fever and unexpected weight change.   HENT: Negative.    Eyes: Negative.    Respiratory: Negative.  Negative for cough.    Cardiovascular: Negative.  Negative for chest pain and leg swelling.   Gastrointestinal: Positive for rectal pain. Negative for abdominal distention, abdominal pain, anal bleeding, blood in stool, constipation, diarrhea, nausea and vomiting.   Endocrine: Negative.    Genitourinary: Negative.    Musculoskeletal: Negative.    Skin: Negative.  Negative for rash.       Objective:      Physical Exam   Constitutional: He appears well-developed and well-nourished. No distress.   HENT:   Head: Normocephalic and atraumatic.   Eyes: Conjunctivae are normal. No scleral icterus.   Neck: Normal range of motion. Neck supple. No tracheal deviation present. No thyromegaly present.   Genitourinary:   Genitourinary Comments: 2 large ext. hemorrhoids  The larger inferior hemorrhoid is very tender   Musculoskeletal: He exhibits no edema.   Lymphadenopathy:     He has no cervical adenopathy.   Skin: Skin is warm and dry. No rash noted.   Vitals reviewed.      Assessment:       1. Hemorrhoid thrombosis        Plan:       Assessment   sitz baths  Discussed otc's  Keep stools soft  General surgery consult  anusol HC supp BID

## 2018-05-11 ENCOUNTER — LAB VISIT (OUTPATIENT)
Dept: LAB | Facility: HOSPITAL | Age: 63
End: 2018-05-11
Attending: SURGERY
Payer: COMMERCIAL

## 2018-05-11 ENCOUNTER — OFFICE VISIT (OUTPATIENT)
Dept: SURGERY | Facility: CLINIC | Age: 63
End: 2018-05-11
Payer: COMMERCIAL

## 2018-05-11 VITALS
DIASTOLIC BLOOD PRESSURE: 80 MMHG | WEIGHT: 240.31 LBS | BODY MASS INDEX: 29.88 KG/M2 | SYSTOLIC BLOOD PRESSURE: 141 MMHG | HEART RATE: 87 BPM | TEMPERATURE: 97 F | HEIGHT: 75 IN

## 2018-05-11 DIAGNOSIS — K64.9 HEMORRHOIDS, UNSPECIFIED HEMORRHOID TYPE: ICD-10-CM

## 2018-05-11 DIAGNOSIS — K64.9 HEMORRHOIDS, UNSPECIFIED HEMORRHOID TYPE: Primary | ICD-10-CM

## 2018-05-11 LAB
ANION GAP SERPL CALC-SCNC: 7 MMOL/L
BASOPHILS # BLD AUTO: 0.05 K/UL
BASOPHILS NFR BLD: 0.7 %
BUN SERPL-MCNC: 11 MG/DL
CALCIUM SERPL-MCNC: 9.5 MG/DL
CHLORIDE SERPL-SCNC: 103 MMOL/L
CO2 SERPL-SCNC: 28 MMOL/L
CREAT SERPL-MCNC: 1 MG/DL
DIFFERENTIAL METHOD: NORMAL
EOSINOPHIL # BLD AUTO: 0.1 K/UL
EOSINOPHIL NFR BLD: 1.7 %
ERYTHROCYTE [DISTWIDTH] IN BLOOD BY AUTOMATED COUNT: 12 %
EST. GFR  (AFRICAN AMERICAN): >60 ML/MIN/1.73 M^2
EST. GFR  (NON AFRICAN AMERICAN): >60 ML/MIN/1.73 M^2
GLUCOSE SERPL-MCNC: 105 MG/DL
HCT VFR BLD AUTO: 48 %
HGB BLD-MCNC: 16.1 G/DL
IMM GRANULOCYTES # BLD AUTO: 0.01 K/UL
IMM GRANULOCYTES NFR BLD AUTO: 0.1 %
LYMPHOCYTES # BLD AUTO: 2 K/UL
LYMPHOCYTES NFR BLD: 27.8 %
MCH RBC QN AUTO: 30.1 PG
MCHC RBC AUTO-ENTMCNC: 33.5 G/DL
MCV RBC AUTO: 90 FL
MONOCYTES # BLD AUTO: 0.8 K/UL
MONOCYTES NFR BLD: 10.5 %
NEUTROPHILS # BLD AUTO: 4.3 K/UL
NEUTROPHILS NFR BLD: 59.2 %
NRBC BLD-RTO: 0 /100 WBC
PLATELET # BLD AUTO: 289 K/UL
PMV BLD AUTO: 9.5 FL
POTASSIUM SERPL-SCNC: 4.1 MMOL/L
RBC # BLD AUTO: 5.35 M/UL
SODIUM SERPL-SCNC: 138 MMOL/L
WBC # BLD AUTO: 7.27 K/UL

## 2018-05-11 PROCEDURE — 80048 BASIC METABOLIC PNL TOTAL CA: CPT

## 2018-05-11 PROCEDURE — 85025 COMPLETE CBC W/AUTO DIFF WBC: CPT

## 2018-05-11 PROCEDURE — 99244 OFF/OP CNSLTJ NEW/EST MOD 40: CPT | Mod: S$GLB,,, | Performed by: SURGERY

## 2018-05-11 PROCEDURE — 36415 COLL VENOUS BLD VENIPUNCTURE: CPT | Mod: PO

## 2018-05-11 PROCEDURE — 99999 PR PBB SHADOW E&M-EST. PATIENT-LVL III: CPT | Mod: PBBFAC,,, | Performed by: SURGERY

## 2018-05-11 RX ORDER — SILDENAFIL 100 MG/1
100 TABLET, FILM COATED ORAL DAILY PRN
COMMUNITY
End: 2018-07-29

## 2018-05-11 RX ORDER — SODIUM CHLORIDE 9 MG/ML
INJECTION, SOLUTION INTRAVENOUS CONTINUOUS
Status: CANCELLED | OUTPATIENT
Start: 2018-05-11

## 2018-05-11 RX ORDER — METRONIDAZOLE 500 MG/100ML
500 INJECTION, SOLUTION INTRAVENOUS
Status: CANCELLED | OUTPATIENT
Start: 2018-05-11

## 2018-05-11 RX ORDER — CETIRIZINE HYDROCHLORIDE, PSEUDOEPHEDRINE HYDROCHLORIDE 5; 120 MG/1; MG/1
1 TABLET, FILM COATED, EXTENDED RELEASE ORAL
COMMUNITY
End: 2018-07-29 | Stop reason: ALTCHOICE

## 2018-05-11 NOTE — PROGRESS NOTES
Subjective:       Patient ID: Mike Ybarra is a 63 y.o. male.    Chief Complaint: Consult (Thrombosed hemorrhoid)    HPI  Pleasant 62 yo M referred tome in consultation from Rafael Garcia  for evaluation of hemorrhoids.  He notes that he had a hemorrhoidectomy 12 years ago.  For the last several months he has had periodic swelling and discomfort in the perianal area.  Notes that when it flares it can be very painful and debilitating.  He notes that over the last 2 weeks has been having increasing pain and discomfort.  Denies bleeding.  No fever/chills.  No n/v.  NO changes in bowel habits.    Review of Systems   Constitutional: Negative for activity change, appetite change, diaphoresis, fever and unexpected weight change.   Respiratory: Negative for cough, chest tightness and wheezing.    Cardiovascular: Negative for chest pain and palpitations.   Gastrointestinal: Positive for rectal pain. Negative for abdominal distention, abdominal pain, anal bleeding, blood in stool, constipation, diarrhea, nausea and vomiting.   Genitourinary: Negative for difficulty urinating, dysuria and hematuria.   Musculoskeletal: Negative for back pain and gait problem.   Skin: Negative for color change and wound.   Neurological: Negative for tremors and seizures.   Psychiatric/Behavioral: Negative for agitation and behavioral problems.       Objective:      Physical Exam   Constitutional: He is oriented to person, place, and time. He appears well-developed and well-nourished.   HENT:   Head: Normocephalic and atraumatic.   Eyes: Pupils are equal, round, and reactive to light. Right eye exhibits no discharge. Left eye exhibits no discharge. No scleral icterus.   Neck: Normal range of motion. No tracheal deviation present. No thyromegaly present.   Cardiovascular: Normal rate, regular rhythm and normal heart sounds.  Exam reveals no gallop and no friction rub.    No murmur heard.  Pulmonary/Chest: Effort normal and breath  sounds normal. He has no wheezes. He exhibits no tenderness.   Abdominal: He exhibits no distension and no mass. There is no tenderness. There is no rebound and no guarding.   Genitourinary:   Genitourinary Comments: Swollen/inflamed enlarged hypertrophied papilla and hemorhroid tags in the R ant and L lateral position   Musculoskeletal: Normal range of motion. He exhibits no edema, tenderness or deformity.   Lymphadenopathy:     He has no cervical adenopathy.        Right: No supraclavicular adenopathy present.        Left: No supraclavicular adenopathy present.   Neurological: He is alert and oriented to person, place, and time. Coordination normal.   Skin: Skin is warm. No rash noted. No erythema. No pallor.   Psychiatric: He has a normal mood and affect. His behavior is normal.   Vitals reviewed.      Assessment:       1. Hemorrhoids, unspecified hemorrhoid type        Plan:       D/w pt. I have recommended surgical removal at a time that is convenient to the pt.  He expresses desire to have done asap.  Surgery scheduled for 5/16.  Informed consent obtained.

## 2018-05-11 NOTE — Clinical Note
I saw your patient, Mike Ybarra in the office.  Attached are my findings and plan.  Thank you for referring him to my office and if you have any questions please do not hesitate to call my cell [phone].  Panchito Morillo

## 2018-05-11 NOTE — PATIENT INSTRUCTIONS
Surgery is scheduled for 05/16/18 arrival time per the preop nurse.  Preop will call from [phone]  Fasting after midnight  Someone to drive you home    PLEASE NOTE THAT SURGERY TIMES CAN CHANGE THE PREOP NURSE WILL GIVE THE ARRIVAL TIME WHEN SHE/HE CALLS.  THE PREOP NURSE WILL CALL, SOMETIMES AS LATE AS 4 PM IN THE AFTERNOON THE DAY BEFORE SURGERY.    Bathe the night before and the morning of your procedure with a Chlorhexidine wash such as Hibiclens, can be purchased at most Pharmacy's.    Special Instruction:

## 2018-05-11 NOTE — LETTER
May 11, 2018      Rafael Garcia PA-C  [address]  [place] [address]           [facility]  [address] [facility] [address] [place] [address]  Phone: [phone]          Patient: Mike Ybarra   MR Number: [id number]   YOB: 1955   Date of Visit: 5/11/2018       Dear Rafael Garcia:    Thank you for referring Mike Ybarra to me for evaluation. Attached you will find relevant portions of my assessment and plan of care.    If you have questions, please do not hesitate to call me. I look forward to following Mike Ybarra along with you.    Sincerely,    Ata Morillo MD    Enclosure  CC:  No Recipients    If you would like to receive this communication electronically, please contact [email or web address] or [phone] to request more information on [employer] Link access.    For providers and/or their staff who would like to refer a patient to [facility], please contact us through our one-stop-shop provider referral line, [facility], at [phone].    If you feel you have received this communication in error or would no longer like to receive these types of communications, please e-mail [email or web address]

## 2018-05-14 ENCOUNTER — TELEPHONE (OUTPATIENT)
Dept: SURGERY | Facility: CLINIC | Age: 63
End: 2018-05-14

## 2018-05-28 ENCOUNTER — OFFICE VISIT (OUTPATIENT)
Dept: SURGERY | Facility: CLINIC | Age: 63
End: 2018-05-28
Payer: COMMERCIAL

## 2018-05-28 VITALS
BODY MASS INDEX: 29.24 KG/M2 | HEART RATE: 90 BPM | DIASTOLIC BLOOD PRESSURE: 96 MMHG | WEIGHT: 233.94 LBS | SYSTOLIC BLOOD PRESSURE: 153 MMHG

## 2018-05-28 DIAGNOSIS — K64.9 HEMORRHOIDS, UNSPECIFIED HEMORRHOID TYPE: Primary | ICD-10-CM

## 2018-05-28 PROCEDURE — 3008F BODY MASS INDEX DOCD: CPT | Mod: CPTII,S$GLB,, | Performed by: SURGERY

## 2018-05-28 PROCEDURE — 3077F SYST BP >= 140 MM HG: CPT | Mod: CPTII,S$GLB,, | Performed by: SURGERY

## 2018-05-28 PROCEDURE — 99999 PR PBB SHADOW E&M-EST. PATIENT-LVL III: CPT | Mod: PBBFAC,,, | Performed by: SURGERY

## 2018-05-28 PROCEDURE — 99213 OFFICE O/P EST LOW 20 MIN: CPT | Mod: S$GLB,,, | Performed by: SURGERY

## 2018-05-28 PROCEDURE — 3080F DIAST BP >= 90 MM HG: CPT | Mod: CPTII,S$GLB,, | Performed by: SURGERY

## 2018-05-28 NOTE — PROGRESS NOTES
Subjective:       Patient ID: Mike Ybarra is a 63 y.o. male.    Chief Complaint: Consult      HPI this is a 63-year-old male who recently saw Dr. sanchez for evaluation of external hemorrhoids.  His surgery was postponed for personal reasons.  He follows up today for re-evaluation.  He says he changed his diet and he has been feeling better.  He is not having significant pain when he has bowel movements.  He had some bleeding after the last exam when he was in the office but this has resolved as well.    Past Medical History:   Diagnosis Date    Hyperlipemia     Internal hemorrhoids     Lumbar disc disease      Past Surgical History:   Procedure Laterality Date    COLONOSCOPY  11/15/2006  [place]    Internal small hemorrhoids were found.  Non-thrombosed external hemorrhoids. Perianal skin tag.  Redundant left colon.     HEMORRHOID SURGERY  12/15/2006  [place]    HERNIA REPAIR      umbilical    TONSILLECTOMY, ADENOIDECTOMY           Current Outpatient Prescriptions:     cetirizine-pseudoephedrine 5-120 mg Tb12, Take 1 tablet by mouth as needed., Disp: , Rfl:     lisinopril 10 MG tablet, TAKE 1 TABLET(10 MG) BY MOUTH EVERY DAY, Disp: 90 tablet, Rfl: 2    sildenafil (VIAGRA) 100 MG tablet, Take 100 mg by mouth daily as needed for Erectile Dysfunction., Disp: , Rfl:     levocetirizine (XYZAL) 5 MG tablet, Take 1 tablet (5 mg total) by mouth every evening., Disp: 30 tablet, Rfl: 11    valacyclovir (VALTREX) 1000 MG tablet, Take 1 tablet (1,000 mg total) by mouth 2 (two) times daily., Disp: 4 tablet, Rfl: 0    Review of patient's allergies indicates:   Allergen Reactions    Avocado      Other reaction(s): Nausea  Other reaction(s): Vomiting    Avocado (laurus persea)      Inside felt very sick       Family History   Problem Relation Age of Onset    Cancer Father         prostate    Gout Brother     Cancer Brother         Skin    Heart attack Paternal Grandfather      Social History     Social  History    Marital status:      Spouse name: N/A    Number of children: 5    Years of education: N/A     Occupational History    educator      [employer]     Social History Main Topics    Smoking status: Former Smoker     Quit date: 4/14/1998    Smokeless tobacco: Never Used    Alcohol use Yes      Comment: rare    Drug use: No    Sexual activity: Yes     Partners: Female     Other Topics Concern    Not on file     Social History Narrative    Significant yard work.  No formal exercise       Review of Systems   Constitutional: Negative for chills, fatigue, fever and unexpected weight change.   HENT: Negative for congestion, sore throat, trouble swallowing and voice change.    Eyes: Negative for redness and visual disturbance.   Respiratory: Negative for cough, shortness of breath and wheezing.    Cardiovascular: Negative for chest pain and palpitations.   Gastrointestinal: Positive for rectal pain. Negative for abdominal pain, blood in stool, nausea and vomiting.   Genitourinary: Negative for dysuria, frequency, hematuria and urgency.   Musculoskeletal: Negative for arthralgias, myalgias and neck pain.   Skin: Negative for rash and wound.   Allergic/Immunologic: Negative.    Neurological: Negative for tremors, seizures, weakness and headaches.   Hematological: Does not bruise/bleed easily.   Psychiatric/Behavioral: Negative for confusion and dysphoric mood. The patient is not nervous/anxious.      Objective:     Physical Exam   Constitutional: He is oriented to person, place, and time. He appears well-developed and well-nourished. No distress.   HENT:   Head: Normocephalic and atraumatic.   Eyes: Conjunctivae and EOM are normal. Pupils are equal, round, and reactive to light. No scleral icterus.   Neck: Normal range of motion. Neck supple.   Cardiovascular: Normal rate, regular rhythm, normal heart sounds and intact distal pulses.    No murmur heard.  Pulmonary/Chest: Effort normal  and breath sounds normal.   Abdominal: Soft. Bowel sounds are normal. He exhibits no distension. There is no tenderness. No hernia.   Genitourinary:   Genitourinary Comments: He does have some external skin tags.  I did not examine him previously but he says these are much better now.  They are not tender on exam.  There is no bleeding.   Musculoskeletal: Normal range of motion. He exhibits no edema or tenderness.   Neurological: He is alert and oriented to person, place, and time. No cranial nerve deficit.   Skin: Skin is warm and dry. No rash noted. No erythema.   Psychiatric: He has a normal mood and affect. His behavior is normal. Judgment normal.     Assessment:     Encounter Diagnosis   Name Primary?    Hemorrhoids, unspecified hemorrhoid type Yes       Plan:      1.  Discussed the option of proceeding with hemorrhoidectomy.  The patient says that since he is feeling better he would like to postpone this for now.  2.  He will follow up as needed.

## 2018-05-28 NOTE — LETTER
May 28, 2018      Rafael Garcia PA-C  [address]  [place] [address]           [facility]  [address] [facility] [address] [place] [address]  Phone: [phone]          Patient: Mike Ybarra   MR Number: [id number]   YOB: 1955   Date of Visit: 5/28/2018       Dear Rafael Garcia:    Thank you for referring Mike Ybarra to me for evaluation. Attached you will find relevant portions of my assessment and plan of care.    If you have questions, please do not hesitate to call me. I look forward to following Mike Ybarra along with you.    Sincerely,    Tyrell Sanon MD    Enclosure  CC:  No Recipients    If you would like to receive this communication electronically, please contact [email or web address] or [phone] to request more information on [employer] Link access.    For providers and/or their staff who would like to refer a patient to [facility], please contact us through our one-stop-shop provider referral line, [facility], at [phone].    If you feel you have received this communication in error or would no longer like to receive these types of communications, please e-mail [email or web address]

## 2018-06-18 ENCOUNTER — PATIENT MESSAGE (OUTPATIENT)
Dept: FAMILY MEDICINE | Facility: CLINIC | Age: 63
End: 2018-06-18

## 2018-06-18 RX ORDER — SILDENAFIL 100 MG/1
100 TABLET, FILM COATED ORAL DAILY PRN
OUTPATIENT
Start: 2018-06-18

## 2018-06-19 RX ORDER — TADALAFIL 20 MG/1
20 TABLET ORAL DAILY
Qty: 6 TABLET | Refills: 2 | Status: SHIPPED | OUTPATIENT
Start: 2018-06-19 | End: 2022-08-23 | Stop reason: SDUPTHER

## 2018-06-20 ENCOUNTER — PATIENT MESSAGE (OUTPATIENT)
Dept: FAMILY MEDICINE | Facility: CLINIC | Age: 63
End: 2018-06-20

## 2018-07-23 ENCOUNTER — TELEPHONE (OUTPATIENT)
Dept: FAMILY MEDICINE | Facility: CLINIC | Age: 63
End: 2018-07-23

## 2018-07-23 ENCOUNTER — OFFICE VISIT (OUTPATIENT)
Dept: SURGERY | Facility: CLINIC | Age: 63
End: 2018-07-23
Payer: COMMERCIAL

## 2018-07-23 ENCOUNTER — LAB VISIT (OUTPATIENT)
Dept: LAB | Facility: HOSPITAL | Age: 63
End: 2018-07-23
Attending: EMERGENCY MEDICINE
Payer: COMMERCIAL

## 2018-07-23 VITALS
WEIGHT: 234 LBS | BODY MASS INDEX: 29.25 KG/M2 | HEART RATE: 84 BPM | DIASTOLIC BLOOD PRESSURE: 96 MMHG | SYSTOLIC BLOOD PRESSURE: 138 MMHG

## 2018-07-23 DIAGNOSIS — Z13.6 SCREENING FOR HYPERTENSION: ICD-10-CM

## 2018-07-23 DIAGNOSIS — Z00.00 ROUTINE GENERAL MEDICAL EXAMINATION AT A HEALTH CARE FACILITY: ICD-10-CM

## 2018-07-23 DIAGNOSIS — K64.9 HEMORRHOIDS, UNSPECIFIED HEMORRHOID TYPE: Primary | ICD-10-CM

## 2018-07-23 DIAGNOSIS — Z13.6 SCREENING FOR HYPERTENSION: Primary | ICD-10-CM

## 2018-07-23 LAB
ALBUMIN SERPL BCP-MCNC: 4.3 G/DL
ALP SERPL-CCNC: 83 U/L
ALT SERPL W/O P-5'-P-CCNC: 32 U/L
ANION GAP SERPL CALC-SCNC: 7 MMOL/L
AST SERPL-CCNC: 22 U/L
BILIRUB SERPL-MCNC: 0.7 MG/DL
BUN SERPL-MCNC: 14 MG/DL
CALCIUM SERPL-MCNC: 9.8 MG/DL
CHLORIDE SERPL-SCNC: 105 MMOL/L
CHOLEST SERPL-MCNC: 233 MG/DL
CHOLEST/HDLC SERPL: 4.8 {RATIO}
CO2 SERPL-SCNC: 27 MMOL/L
CREAT SERPL-MCNC: 0.9 MG/DL
EST. GFR  (AFRICAN AMERICAN): >60 ML/MIN/1.73 M^2
EST. GFR  (NON AFRICAN AMERICAN): >60 ML/MIN/1.73 M^2
GLUCOSE SERPL-MCNC: 109 MG/DL
HDLC SERPL-MCNC: 49 MG/DL
HDLC SERPL: 21 %
LDLC SERPL CALC-MCNC: 156 MG/DL
NONHDLC SERPL-MCNC: 184 MG/DL
POTASSIUM SERPL-SCNC: 4.3 MMOL/L
PROT SERPL-MCNC: 7.3 G/DL
SODIUM SERPL-SCNC: 139 MMOL/L
TRIGL SERPL-MCNC: 140 MG/DL

## 2018-07-23 PROCEDURE — 3075F SYST BP GE 130 - 139MM HG: CPT | Mod: CPTII,S$GLB,, | Performed by: SURGERY

## 2018-07-23 PROCEDURE — 3080F DIAST BP >= 90 MM HG: CPT | Mod: CPTII,S$GLB,, | Performed by: SURGERY

## 2018-07-23 PROCEDURE — 99213 OFFICE O/P EST LOW 20 MIN: CPT | Mod: S$GLB,,, | Performed by: SURGERY

## 2018-07-23 PROCEDURE — 36415 COLL VENOUS BLD VENIPUNCTURE: CPT | Mod: PO

## 2018-07-23 PROCEDURE — 80053 COMPREHEN METABOLIC PANEL: CPT

## 2018-07-23 PROCEDURE — 80061 LIPID PANEL: CPT

## 2018-07-23 PROCEDURE — 3008F BODY MASS INDEX DOCD: CPT | Mod: CPTII,S$GLB,, | Performed by: SURGERY

## 2018-07-23 PROCEDURE — 99999 PR PBB SHADOW E&M-EST. PATIENT-LVL III: CPT | Mod: PBBFAC,,, | Performed by: SURGERY

## 2018-07-24 ENCOUNTER — PATIENT MESSAGE (OUTPATIENT)
Dept: FAMILY MEDICINE | Facility: CLINIC | Age: 63
End: 2018-07-24

## 2018-07-24 ENCOUNTER — LAB VISIT (OUTPATIENT)
Dept: LAB | Facility: HOSPITAL | Age: 63
End: 2018-07-24
Attending: EMERGENCY MEDICINE
Payer: COMMERCIAL

## 2018-07-24 ENCOUNTER — OFFICE VISIT (OUTPATIENT)
Dept: FAMILY MEDICINE | Facility: CLINIC | Age: 63
End: 2018-07-24
Payer: COMMERCIAL

## 2018-07-24 VITALS
HEART RATE: 99 BPM | DIASTOLIC BLOOD PRESSURE: 90 MMHG | HEIGHT: 75 IN | OXYGEN SATURATION: 98 % | WEIGHT: 233 LBS | SYSTOLIC BLOOD PRESSURE: 142 MMHG | RESPIRATION RATE: 18 BRPM | BODY MASS INDEX: 28.97 KG/M2

## 2018-07-24 DIAGNOSIS — Z12.5 PROSTATE CANCER SCREENING: ICD-10-CM

## 2018-07-24 DIAGNOSIS — E78.2 MIXED HYPERLIPIDEMIA: Chronic | ICD-10-CM

## 2018-07-24 DIAGNOSIS — I10 ESSENTIAL HYPERTENSION: Primary | Chronic | ICD-10-CM

## 2018-07-24 DIAGNOSIS — Z80.42 FAMILY HISTORY OF PROSTATE CANCER: Chronic | ICD-10-CM

## 2018-07-24 PROCEDURE — 3008F BODY MASS INDEX DOCD: CPT | Mod: CPTII,S$GLB,, | Performed by: EMERGENCY MEDICINE

## 2018-07-24 PROCEDURE — 99213 OFFICE O/P EST LOW 20 MIN: CPT | Mod: S$GLB,,, | Performed by: EMERGENCY MEDICINE

## 2018-07-24 PROCEDURE — 3077F SYST BP >= 140 MM HG: CPT | Mod: CPTII,S$GLB,, | Performed by: EMERGENCY MEDICINE

## 2018-07-24 PROCEDURE — 36415 COLL VENOUS BLD VENIPUNCTURE: CPT | Mod: PO

## 2018-07-24 PROCEDURE — 99999 PR PBB SHADOW E&M-EST. PATIENT-LVL III: CPT | Mod: PBBFAC,,, | Performed by: EMERGENCY MEDICINE

## 2018-07-24 PROCEDURE — 3080F DIAST BP >= 90 MM HG: CPT | Mod: CPTII,S$GLB,, | Performed by: EMERGENCY MEDICINE

## 2018-07-24 PROCEDURE — 84153 ASSAY OF PSA TOTAL: CPT

## 2018-07-24 NOTE — PROGRESS NOTES
Subjective:   THIS NOTE IS DONE WITH VOICE RECOGNITION        Patient ID: Mike Ybarra is a 63 y.o. male.    Chief Complaint: Hyperlipidemia      HPI     Jayme is in today to follow-up on his health maintenance, specifically is hyperlipidemia, hypertension, and his family history of prostate cancer.    He did have a basal cell carcinoma that required no surgery.  This is on the extensor surface of the arm.  The surgical outcome is excellent.  He does have high risk in and will continue to monitor closely.  He is using Dr. Erica French for Dermatology.    His blood pressure at home has been excellent.  His blood pressure in the office is never well controlled.  We did briefly talk about the digital hypertension program.  There is no commitment May.    BP Readings from Last 3 Encounters:   07/24/18 (!) 142/90   07/23/18 (!) 138/96   05/28/18 (!) 153/96     Results of his cholesterol profile were reviewed with him.  He has gained some weight.  He is adamant about not wanting to take medications in prefers to try additional exercise and weight control before medication intervention.    Lab Results   Component Value Date    CHOL 233 (H) 07/23/2018    CHOL 253 (H) 03/06/2017    CHOL 223 (H) 12/14/2015     Lab Results   Component Value Date    HDL 49 07/23/2018    HDL 48 03/06/2017    HDL 51 12/14/2015     Lab Results   Component Value Date    LDLCALC 156.0 07/23/2018    LDLCALC 172.2 (H) 03/06/2017    LDLCALC 146.6 12/14/2015     Lab Results   Component Value Date    TRIG 140 07/23/2018    TRIG 164 (H) 03/06/2017    TRIG 127 12/14/2015     Lab Results   Component Value Date    CHOLHDL 21.0 07/23/2018    CHOLHDL 19.0 (L) 03/06/2017    CHOLHDL 22.9 12/14/2015     He does have a family history of prostate cancer.  He has no symptoms.  Lab Results   Component Value Date    PSA 1.4 07/24/2018    PSA 1.4 03/06/2017    PSA 0.89 12/14/2015     Internal hemorrhoids have been episodically problematic.  He has been seen  by General surgery, with the recommendation of his observation, a focus on reducing the dryness and the caliber of his stool, and not doing surgery.  I would 100 percent agree with this.  He is comfortable with this approach.    V final cycle of years working well to control his recurrent viral outbreaks.  He would like to continue this medication.    His allergies are reasonably well controlled with OTC antihistamines.  I have encouraged him not to use decongestants.  Preferred medicines to be plain Zyrtec or Allegra.  Topical steroids discussed.    No change in social history, surgical history, medical history, or family history.      There is no immunization history on file for this patient.         Current Outpatient Prescriptions   Medication Sig Dispense Refill    cetirizine-pseudoephedrine 5-120 mg Tb12 Take 1 tablet by mouth as needed.      levocetirizine (XYZAL) 5 MG tablet Take 1 tablet (5 mg total) by mouth every evening. 30 tablet 11    lisinopril 10 MG tablet TAKE 1 TABLET(10 MG) BY MOUTH EVERY DAY 90 tablet 2    sildenafil (VIAGRA) 100 MG tablet Take 100 mg by mouth daily as needed for Erectile Dysfunction.      tadalafil (CIALIS) 20 MG Tab Take 1 tablet (20 mg total) by mouth once daily. 6 tablet 2    valacyclovir (VALTREX) 1000 MG tablet Take 1 tablet (1,000 mg total) by mouth 2 (two) times daily. 4 tablet 0     No current facility-administered medications for this visit.          Review of Systems   Constitutional: Negative for activity change and unexpected weight change.   HENT: Negative for hearing loss, rhinorrhea and trouble swallowing.    Eyes: Negative for discharge and visual disturbance.   Respiratory: Negative for chest tightness and wheezing.    Cardiovascular: Negative for chest pain and palpitations.   Gastrointestinal: Negative for blood in stool, constipation, diarrhea and vomiting.   Endocrine: Negative for polydipsia and polyuria.   Genitourinary: Negative for difficulty  urinating, hematuria and urgency.   Musculoskeletal: Negative for arthralgias, joint swelling and neck pain.   Neurological: Negative for weakness and headaches.   Psychiatric/Behavioral: Negative for confusion and dysphoric mood.       Objective:      Physical Exam   Constitutional: He is oriented to person, place, and time. He appears well-developed and well-nourished. No distress.   HENT:   Head: Normocephalic and atraumatic.   Right Ear: External ear normal.   Left Ear: External ear normal.   Nose: Nose normal.   Mouth/Throat: Oropharynx is clear and moist. No oropharyngeal exudate.   Eyes: Conjunctivae and EOM are normal. Pupils are equal, round, and reactive to light. No scleral icterus.   Neck: Normal range of motion. Neck supple. No thyromegaly present.   Cardiovascular: Normal rate, regular rhythm and normal heart sounds.    Pulmonary/Chest: Effort normal and breath sounds normal. He has no wheezes. He has no rales.   Abdominal: Soft. Bowel sounds are normal. He exhibits no distension. There is no tenderness.   Musculoskeletal: Normal range of motion. He exhibits no edema or tenderness.   Lymphadenopathy:     He has no cervical adenopathy.   Neurological: He is alert and oriented to person, place, and time. He has normal reflexes. He exhibits normal muscle tone. Coordination normal.   Skin: Skin is warm and dry. No rash noted.        Psychiatric: He has a normal mood and affect. His behavior is normal.   Vitals reviewed.      Assessment:       1. Essential hypertension    2. Prostate cancer screening    3. Mixed hyperlipidemia    4. Family history of prostate cancer        Plan:       1. Essential hypertension  Not controlled   Recheck with nursing visit in a couple of weeks  Avoid decongestants  Aspirin 81 milligrams daily recommended    2. Prostate cancer screening  No symptoms.  PsA is stable.    - PSA, Screening; Future    3. Mixed hyperlipidemia  Not at target  Resistant to medications  Will focus on  diet and exercise  Recheck lipid profile in 2-3 months.    4. Family history of prostate cancer  See above.  Will check PSA now.    Positive response to bowel cycle of Ear, will continue to use this as needed.

## 2018-07-25 LAB — COMPLEXED PSA SERPL-MCNC: 1.4 NG/ML

## 2018-07-29 NOTE — PATIENT INSTRUCTIONS
Mike,    With your blood pressure, we do need to know that it is controlled.  I was hoping we can get you to come in for a nursing visit the can be done at your convenience.  I would like to stay take the stress of a formal encounter out of that visit to see if your blood pressure is controlled.    I noticed that you were taking some medicine for allergy that included decongestant.  I would encourage you not to use antihistamines with decongestant.  This is the the D component in the label.    As we discussed, your cholesterol profile is elevated.  I am happy to recheck this at any time your ready.  We would normally add low-dose atorvastatin or simvastatin to your medical regimen.  Certainly we should monitor this in 6 months.  We can do this with a simple blood test.    Tyrell Chavarria MD

## 2018-07-30 NOTE — PROGRESS NOTES
Patient ID: Mike Ybarra is a 63 y.o. male.     Chief Complaint: Consult        HPI this is a 63-year-old male who recently saw Dr. sanchez for evaluation of external hemorrhoids.  His surgery was postponed for personal reasons.  He follows up today for re-evaluation.  He says he changed his diet and he has been feeling better.  He is not having significant pain when he has bowel movements.  He had some bleeding after the last exam when he was in the office but this has resolved as well. He returns today after the previous visit.  He continues to do well.  He has not had any significant complaints.  He wanted me to reexamine him in to see if he should proceed with hemorrhoidectomy.  Symptomatically he is improved apparently.          Past Medical History:   Diagnosis Date    Hyperlipemia      Internal hemorrhoids      Lumbar disc disease              Past Surgical History:   Procedure Laterality Date    COLONOSCOPY   11/15/2006  [facility]     Internal small hemorrhoids were found.  Non-thrombosed external hemorrhoids. Perianal skin tag.  Redundant left colon.     HEMORRHOID SURGERY   12/15/2006  [place]    HERNIA REPAIR         umbilical    TONSILLECTOMY, ADENOIDECTOMY                Current Outpatient Prescriptions:     cetirizine-pseudoephedrine 5-120 mg Tb12, Take 1 tablet by mouth as needed., Disp: , Rfl:     lisinopril 10 MG tablet, TAKE 1 TABLET(10 MG) BY MOUTH EVERY DAY, Disp: 90 tablet, Rfl: 2    sildenafil (VIAGRA) 100 MG tablet, Take 100 mg by mouth daily as needed for Erectile Dysfunction., Disp: , Rfl:     levocetirizine (XYZAL) 5 MG tablet, Take 1 tablet (5 mg total) by mouth every evening., Disp: 30 tablet, Rfl: 11    valacyclovir (VALTREX) 1000 MG tablet, Take 1 tablet (1,000 mg total) by mouth 2 (two) times daily., Disp: 4 tablet, Rfl: 0           Review of patient's allergies indicates:   Allergen Reactions    Avocado         Other reaction(s): Nausea  Other reaction(s): Vomiting     Markel (laurus persea)         Inside felt very sick               Family History   Problem Relation Age of Onset    Cancer Father           prostate    Gout Brother      Cancer Brother           Skin    Heart attack Paternal Grandfather        Social History   Social History            Social History    Marital status:        Spouse name: N/A    Number of children: 5    Years of education: N/A           Occupational History    educator        [employer]             Social History Main Topics    Smoking status: Former Smoker       Quit date: 4/14/1998    Smokeless tobacco: Never Used    Alcohol use Yes         Comment: rare    Drug use: No    Sexual activity: Yes       Partners: Female           Other Topics Concern    Not on file          Social History Narrative     Significant yard work.  No formal exercise            Review of Systems   Constitutional: Negative for chills, fatigue, fever and unexpected weight change.   HENT: Negative for congestion, sore throat, trouble swallowing and voice change.    Eyes: Negative for redness and visual disturbance.   Respiratory: Negative for cough, shortness of breath and wheezing.    Cardiovascular: Negative for chest pain and palpitations.   Gastrointestinal: Positive for rectal pain. Negative for abdominal pain, blood in stool, nausea and vomiting.   Genitourinary: Negative for dysuria, frequency, hematuria and urgency.   Musculoskeletal: Negative for arthralgias, myalgias and neck pain.   Skin: Negative for rash and wound.   Allergic/Immunologic: Negative.    Neurological: Negative for tremors, seizures, weakness and headaches.   Hematological: Does not bruise/bleed easily.   Psychiatric/Behavioral: Negative for confusion and dysphoric mood. The patient is not nervous/anxious.       Objective:      Physical Exam   Constitutional: He is oriented to person, place, and time. He appears well-developed and well-nourished. No distress.    HENT:   Head: Normocephalic and atraumatic.   Eyes: Conjunctivae and EOM are normal. Pupils are equal, round, and reactive to light. No scleral icterus.   Neck: Normal range of motion. Neck supple.   Cardiovascular: Normal rate, regular rhythm, normal heart sounds and intact distal pulses.    No murmur heard.  Pulmonary/Chest: Effort normal and breath sounds normal.   Abdominal: Soft. Bowel sounds are normal. He exhibits no distension. There is no tenderness. No hernia.   Genitourinary:   Genitourinary Comments: He does have some external skin tags.  I did not examine him previously but he says these are much better now.  They are not tender on exam.  There is no bleeding.  no change in his clinical exam since the last visit.  There is no bleeding.  Musculoskeletal: Normal range of motion. He exhibits no edema or tenderness.   Neurological: He is alert and oriented to person, place, and time. No cranial nerve deficit.   Skin: Skin is warm and dry. No rash noted. No erythema.   Psychiatric: He has a normal mood and affect. His behavior is normal. Judgment normal.      Assessment:           Encounter Diagnosis   Name Primary?    Hemorrhoids, unspecified hemorrhoid type Yes         Plan:      1.  Patient would like to continue observation.  2.  He will follow up with me as needed.

## 2018-08-26 DIAGNOSIS — I10 ESSENTIAL HYPERTENSION: ICD-10-CM

## 2018-08-26 RX ORDER — LISINOPRIL 10 MG/1
TABLET ORAL
Qty: 90 TABLET | Refills: 3 | Status: SHIPPED | OUTPATIENT
Start: 2018-08-26 | End: 2020-04-03 | Stop reason: SDUPTHER

## 2018-10-26 ENCOUNTER — PATIENT MESSAGE (OUTPATIENT)
Dept: ADMINISTRATIVE | Facility: HOSPITAL | Age: 63
End: 2018-10-26

## 2018-10-29 ENCOUNTER — CLINICAL SUPPORT (OUTPATIENT)
Dept: FAMILY MEDICINE | Facility: CLINIC | Age: 63
End: 2018-10-29
Payer: COMMERCIAL

## 2018-10-29 VITALS — DIASTOLIC BLOOD PRESSURE: 82 MMHG | SYSTOLIC BLOOD PRESSURE: 130 MMHG

## 2018-10-29 DIAGNOSIS — Z01.30 BP CHECK: Primary | ICD-10-CM

## 2018-10-29 PROCEDURE — 99499 UNLISTED E&M SERVICE: CPT | Mod: S$GLB,,, | Performed by: EMERGENCY MEDICINE

## 2018-10-29 PROCEDURE — 99999 PR PBB SHADOW E&M-EST. PATIENT-LVL I: CPT | Mod: PBBFAC,,,

## 2018-10-31 NOTE — PROGRESS NOTES
.  Mike Joseph Ybarra 63 y.o. male is here today for Blood Pressure check.   History of HTN no.    Review of patient's allergies indicates:   Allergen Reactions    Avocado      Other reaction(s): Nausea  Other reaction(s): Vomiting    Avocado (laurus persea)      Inside felt very sick     Creatinine   Date Value Ref Range Status   07/23/2018 0.9 0.5 - 1.4 mg/dL Final     Sodium   Date Value Ref Range Status   07/23/2018 139 136 - 145 mmol/L Final     Potassium   Date Value Ref Range Status   07/23/2018 4.3 3.5 - 5.1 mmol/L Final   ]  Patient verifies taking blood pressure medications on a regular basis at the same time of the day.     Current Outpatient Medications:     levocetirizine (XYZAL) 5 MG tablet, Take 1 tablet (5 mg total) by mouth every evening., Disp: 30 tablet, Rfl: 11    lisinopril 10 MG tablet, TAKE 1 TABLET(10 MG) BY MOUTH EVERY DAY, Disp: 90 tablet, Rfl: 3    tadalafil (CIALIS) 20 MG Tab, Take 1 tablet (20 mg total) by mouth once daily., Disp: 6 tablet, Rfl: 2    valacyclovir (VALTREX) 1000 MG tablet, Take 1 tablet (1,000 mg total) by mouth 2 (two) times daily., Disp: 4 tablet, Rfl: 0  Does patient have record of home blood pressure readings no. Readings have been averaging n/a.   Last dose of blood pressure medication was taken at n/a.  Patient is asymptomatic.   Complains of n/a      BP: 130/82 ,   .    Blood pressure reading after 15 minutes was 130/82, Pulse n/a.  Dr. Chavarria   notified.

## 2019-05-24 ENCOUNTER — PATIENT MESSAGE (OUTPATIENT)
Dept: FAMILY MEDICINE | Facility: CLINIC | Age: 64
End: 2019-05-24

## 2019-05-24 DIAGNOSIS — Z00.00 ROUTINE ADULT HEALTH MAINTENANCE: Primary | ICD-10-CM

## 2019-05-24 RX ORDER — VALACYCLOVIR HYDROCHLORIDE 1 G/1
1000 TABLET, FILM COATED ORAL 2 TIMES DAILY
Qty: 4 TABLET | Refills: 0 | Status: SHIPPED | OUTPATIENT
Start: 2019-05-24 | End: 2019-08-01

## 2019-05-24 RX ORDER — VALACYCLOVIR HYDROCHLORIDE 1 G/1
1000 TABLET, FILM COATED ORAL 2 TIMES DAILY
Qty: 4 TABLET | Refills: 0 | OUTPATIENT
Start: 2019-05-24 | End: 2021-02-24

## 2019-07-23 ENCOUNTER — PATIENT MESSAGE (OUTPATIENT)
Dept: FAMILY MEDICINE | Facility: CLINIC | Age: 64
End: 2019-07-23

## 2019-07-29 ENCOUNTER — TELEPHONE (OUTPATIENT)
Dept: FAMILY MEDICINE | Facility: CLINIC | Age: 64
End: 2019-07-29

## 2019-07-29 DIAGNOSIS — Z12.5 PROSTATE CANCER SCREENING: ICD-10-CM

## 2019-07-29 DIAGNOSIS — Z00.00 ANNUAL PHYSICAL EXAM: Primary | ICD-10-CM

## 2019-07-29 NOTE — TELEPHONE ENCOUNTER
----- Message from Rachael Laytonza sent at 7/29/2019  7:44 AM CDT -----  Contact: self [phone]  He is requesting that an order be placed for his blood tests.  He is coming in on 8/1 and would like to have the results when he comes in on 8/1.  Please notify him when the orders are entered. Thank you!

## 2019-07-30 ENCOUNTER — LAB VISIT (OUTPATIENT)
Dept: LAB | Facility: HOSPITAL | Age: 64
End: 2019-07-30
Attending: EMERGENCY MEDICINE
Payer: COMMERCIAL

## 2019-07-30 DIAGNOSIS — Z12.5 PROSTATE CANCER SCREENING: ICD-10-CM

## 2019-07-30 DIAGNOSIS — Z00.00 ANNUAL PHYSICAL EXAM: ICD-10-CM

## 2019-07-30 LAB
ALBUMIN SERPL BCP-MCNC: 4.1 G/DL (ref 3.5–5.2)
ALP SERPL-CCNC: 73 U/L (ref 55–135)
ALT SERPL W/O P-5'-P-CCNC: 36 U/L (ref 10–44)
ANION GAP SERPL CALC-SCNC: 7 MMOL/L (ref 8–16)
AST SERPL-CCNC: 25 U/L (ref 10–40)
BILIRUB SERPL-MCNC: 0.7 MG/DL (ref 0.1–1)
BUN SERPL-MCNC: 13 MG/DL (ref 8–23)
CALCIUM SERPL-MCNC: 9.5 MG/DL (ref 8.7–10.5)
CHLORIDE SERPL-SCNC: 102 MMOL/L (ref 95–110)
CHOLEST SERPL-MCNC: 230 MG/DL (ref 120–199)
CHOLEST/HDLC SERPL: 4.5 {RATIO} (ref 2–5)
CO2 SERPL-SCNC: 27 MMOL/L (ref 23–29)
COMPLEXED PSA SERPL-MCNC: 1.3 NG/ML (ref 0–4)
CREAT SERPL-MCNC: 0.9 MG/DL (ref 0.5–1.4)
EST. GFR  (AFRICAN AMERICAN): >60 ML/MIN/1.73 M^2
EST. GFR  (NON AFRICAN AMERICAN): >60 ML/MIN/1.73 M^2
GLUCOSE SERPL-MCNC: 112 MG/DL (ref 70–110)
HDLC SERPL-MCNC: 51 MG/DL (ref 40–75)
HDLC SERPL: 22.2 % (ref 20–50)
LDLC SERPL CALC-MCNC: 149.4 MG/DL (ref 63–159)
NONHDLC SERPL-MCNC: 179 MG/DL
POTASSIUM SERPL-SCNC: 4.4 MMOL/L (ref 3.5–5.1)
PROT SERPL-MCNC: 7.2 G/DL (ref 6–8.4)
SODIUM SERPL-SCNC: 136 MMOL/L (ref 136–145)
TRIGL SERPL-MCNC: 148 MG/DL (ref 30–150)

## 2019-07-30 PROCEDURE — 84153 ASSAY OF PSA TOTAL: CPT

## 2019-07-30 PROCEDURE — 36415 COLL VENOUS BLD VENIPUNCTURE: CPT | Mod: PO

## 2019-07-30 PROCEDURE — 80061 LIPID PANEL: CPT

## 2019-07-30 PROCEDURE — 80053 COMPREHEN METABOLIC PANEL: CPT

## 2019-08-01 ENCOUNTER — OFFICE VISIT (OUTPATIENT)
Dept: FAMILY MEDICINE | Facility: CLINIC | Age: 64
End: 2019-08-01
Payer: COMMERCIAL

## 2019-08-01 ENCOUNTER — LAB VISIT (OUTPATIENT)
Dept: LAB | Facility: HOSPITAL | Age: 64
End: 2019-08-01
Attending: EMERGENCY MEDICINE
Payer: COMMERCIAL

## 2019-08-01 VITALS
SYSTOLIC BLOOD PRESSURE: 120 MMHG | DIASTOLIC BLOOD PRESSURE: 82 MMHG | WEIGHT: 236.56 LBS | RESPIRATION RATE: 16 BRPM | HEART RATE: 76 BPM | BODY MASS INDEX: 29.57 KG/M2

## 2019-08-01 DIAGNOSIS — Z80.42 FAMILY HISTORY OF PROSTATE CANCER: Chronic | ICD-10-CM

## 2019-08-01 DIAGNOSIS — R73.9 HYPERGLYCEMIA: ICD-10-CM

## 2019-08-01 DIAGNOSIS — E78.2 MIXED HYPERLIPIDEMIA: Chronic | ICD-10-CM

## 2019-08-01 DIAGNOSIS — Z00.00 PREVENTATIVE HEALTH CARE: Primary | ICD-10-CM

## 2019-08-01 DIAGNOSIS — I10 ESSENTIAL HYPERTENSION: Chronic | ICD-10-CM

## 2019-08-01 LAB
ESTIMATED AVG GLUCOSE: 117 MG/DL (ref 68–131)
HBA1C MFR BLD HPLC: 5.7 % (ref 4–5.6)

## 2019-08-01 PROCEDURE — 99396 PR PREVENTIVE VISIT,EST,40-64: ICD-10-PCS | Mod: S$GLB,,, | Performed by: EMERGENCY MEDICINE

## 2019-08-01 PROCEDURE — 99999 PR PBB SHADOW E&M-EST. PATIENT-LVL III: ICD-10-PCS | Mod: PBBFAC,,, | Performed by: EMERGENCY MEDICINE

## 2019-08-01 PROCEDURE — 3079F DIAST BP 80-89 MM HG: CPT | Mod: CPTII,S$GLB,, | Performed by: EMERGENCY MEDICINE

## 2019-08-01 PROCEDURE — 99396 PREV VISIT EST AGE 40-64: CPT | Mod: S$GLB,,, | Performed by: EMERGENCY MEDICINE

## 2019-08-01 PROCEDURE — 36415 COLL VENOUS BLD VENIPUNCTURE: CPT | Mod: PO

## 2019-08-01 PROCEDURE — 83036 HEMOGLOBIN GLYCOSYLATED A1C: CPT

## 2019-08-01 PROCEDURE — 99999 PR PBB SHADOW E&M-EST. PATIENT-LVL III: CPT | Mod: PBBFAC,,, | Performed by: EMERGENCY MEDICINE

## 2019-08-01 PROCEDURE — 3074F PR MOST RECENT SYSTOLIC BLOOD PRESSURE < 130 MM HG: ICD-10-PCS | Mod: CPTII,S$GLB,, | Performed by: EMERGENCY MEDICINE

## 2019-08-01 PROCEDURE — 3074F SYST BP LT 130 MM HG: CPT | Mod: CPTII,S$GLB,, | Performed by: EMERGENCY MEDICINE

## 2019-08-01 PROCEDURE — 3079F PR MOST RECENT DIASTOLIC BLOOD PRESSURE 80-89 MM HG: ICD-10-PCS | Mod: CPTII,S$GLB,, | Performed by: EMERGENCY MEDICINE

## 2019-08-01 NOTE — PROGRESS NOTES
Subjective:   THIS NOTE IS DONE WITH VOICE RECOGNITION        Patient ID: Mike Ybarra is a 64 y.o. male.    Chief Complaint: Annual Exam      HPI     Here to follow up on blood pressure.  Taking current medications.  Most recent renal functions are well within normal limits.  All labs reviewed.    BP Readings from Last 3 Encounters:   08/01/19 120/82   10/29/18 130/82   07/24/18 (!) 142/90     Hyperlipidemia monitoring.  No statin medication at this time.    Lab Results   Component Value Date    CHOL 230 (H) 07/30/2019    CHOL 233 (H) 07/23/2018    CHOL 253 (H) 03/06/2017     Lab Results   Component Value Date    HDL 51 07/30/2019    HDL 49 07/23/2018    HDL 48 03/06/2017     Lab Results   Component Value Date    LDLCALC 149.4 07/30/2019    LDLCALC 156.0 07/23/2018    LDLCALC 172.2 (H) 03/06/2017     Lab Results   Component Value Date    TRIG 148 07/30/2019    TRIG 140 07/23/2018    TRIG 164 (H) 03/06/2017     Lab Results   Component Value Date    CHOLHDL 22.2 07/30/2019    CHOLHDL 21.0 07/23/2018    CHOLHDL 19.0 (L) 03/06/2017     The 10-year CVD risk score (D'Agostino, et al., 2008) is: 21.3%    Values used to calculate the score:      Age: 64 years      Sex: Male      Diabetic: No      Tobacco smoker: No      Systolic Blood Pressure: 120 mmHg      Is BP treated: Yes      HDL Cholesterol: 51 mg/dL      Total Cholesterol: 230 mg/dL    He does have a family history of prostate disease, his father had prostate cancer diagnosed in his 70s.  He is interested in maintaining annual screening.      Lab Results   Component Value Date    PSA 1.3 07/30/2019    PSA 1.4 07/24/2018    PSA 1.4 03/06/2017     He continues to have an asymptomatic hemorrhoids.    Allergies are better with his diet changes, which are most vegan.    Colon cancer screening is current.  Anticipating repeat colonoscopy in 2024.      There is no immunization history on file for this patient.      Current Outpatient Medications   Medication  Sig Dispense Refill    levocetirizine (XYZAL) 5 MG tablet Take 1 tablet (5 mg total) by mouth every evening. 30 tablet 11    lisinopril 10 MG tablet TAKE 1 TABLET(10 MG) BY MOUTH EVERY DAY 90 tablet 3    tadalafil (CIALIS) 20 MG Tab Take 1 tablet (20 mg total) by mouth once daily. 6 tablet 2    valACYclovir (VALTREX) 1000 MG tablet Take 1 tablet (1,000 mg total) by mouth 2 (two) times daily. 4 tablet 0     No current facility-administered medications for this visit.        Patient entered via patient portal and augmented by myself.    Review of Systems   Constitutional: Negative for activity change and unexpected weight change.   HENT: Negative for hearing loss, rhinorrhea and trouble swallowing.    Eyes: Negative for discharge and visual disturbance.   Respiratory: Negative for chest tightness and wheezing.    Cardiovascular: Negative for chest pain and palpitations.   Gastrointestinal: Negative for blood in stool, constipation, diarrhea and vomiting.   Endocrine: Negative for polydipsia and polyuria.   Genitourinary: Negative for difficulty urinating, hematuria and urgency.   Musculoskeletal: Negative for arthralgias, joint swelling and neck pain.   Neurological: Negative for weakness and headaches.   Psychiatric/Behavioral: Negative for confusion and dysphoric mood.       Objective:      Physical Exam   Constitutional: He is oriented to person, place, and time. He appears well-developed and well-nourished. No distress.   HENT:   Head: Normocephalic and atraumatic.   Right Ear: External ear normal.   Left Ear: External ear normal.   Nose: Nose normal.   Mouth/Throat: Oropharynx is clear and moist. No oropharyngeal exudate.   Eyes: Pupils are equal, round, and reactive to light. Conjunctivae and EOM are normal. No scleral icterus.   Neck: Normal range of motion. Neck supple. No thyromegaly present.   Cardiovascular: Normal rate, regular rhythm and normal heart sounds.   Pulmonary/Chest: Effort normal and breath  sounds normal. He has no wheezes. He has no rales.   Abdominal: Soft. Bowel sounds are normal. He exhibits no distension. There is no tenderness.   Musculoskeletal: Normal range of motion. He exhibits no edema or tenderness.   Lymphadenopathy:     He has no cervical adenopathy.   Neurological: He is alert and oriented to person, place, and time. He has normal reflexes. He exhibits normal muscle tone. Coordination normal.   Skin: Skin is warm and dry. No rash noted.   Psychiatric: He has a normal mood and affect. His behavior is normal.   Vitals reviewed.      Assessment:       1. Preventative health care    2. Essential hypertension    3. Family history of prostate cancer    4. Mixed hyperlipidemia    5. Hyperglycemia        Plan:       1. Preventative health care  He has substantially improved his diet  Cancer screening discussed.  Current  Regular exercise encourage      2. Essential hypertension  Controlled.  Continue current medications  Continue to avoid excessive sodium  Continue home monitoring  Target blood pressure is 139/89 or less    3. Family history of prostate cancer  Annual PSA recommended  Currently at target    4. Mixed hyperlipidemia  The addition statin medication was discussed  He is reluctant to pursue this at this time  I do anticipate continuing to work with him, as given his age, his hypertension, he does have risk for coronary disease. If he does changes mind, were happy to add a statin medication and recheck.    5. Hyperglycemia  Mildly elevated glucose  I do not think he has diabetes  Will obtain hemoglobin A1c.    - Hemoglobin A1c; Future

## 2019-08-03 NOTE — PATIENT INSTRUCTIONS
Mike,    There was good news to see that your hemoglobin A1c score was so low.  I do not think you of diabetes.    I did want to make you aware of 1 other important calculation.  We do do some predictive mottling to try to vera who is going to developed coronary disease based on age, gender, blood pressure, blood sugar,and tobacco exposure.    The 10-year CVD risk score (ARMANDO'Angelitaino, et al., 2008) is: 21.3%    Values used to calculate the score:      Age: 64 years      Sex: Male      Diabetic: No      Tobacco smoker: No      Systolic Blood Pressure: 120 mmHg      Is BP treated: Yes      HDL Cholesterol: 51 mg/dL      Total Cholesterol: 230 mg/dL    We do like to see the risk less than 10 percent.  The only variable that we can significantly impact given your excellent blood pressure control is your cholesterol numbers.  I would recommend considering starting low-dose atorvastatin (Lipitor).  The cause for this is minimal.  The side effects, at low-dose, are generally manageable.  If this is something you would like to consider, please let me know    Tyrell Chavarria MD

## 2020-01-21 ENCOUNTER — TELEPHONE (OUTPATIENT)
Dept: FAMILY MEDICINE | Facility: CLINIC | Age: 65
End: 2020-01-21

## 2020-01-21 DIAGNOSIS — B00.1 FEVER BLISTER: Primary | ICD-10-CM

## 2020-01-21 RX ORDER — VALACYCLOVIR HYDROCHLORIDE 1 G/1
1000 TABLET, FILM COATED ORAL 2 TIMES DAILY
Qty: 4 TABLET | Refills: 0 | Status: SHIPPED | OUTPATIENT
Start: 2020-01-21 | End: 2020-10-15 | Stop reason: SDUPTHER

## 2020-01-21 NOTE — TELEPHONE ENCOUNTER
----- Message from Kimberly Morrison sent at 1/21/2020  4:25 PM CST -----  Contact: patient  Type: Needs Medical Advice  Who Called:  patient  Pharmacy name and phone #:    [facility][id number] - [facility] [address] [facility] [address]  [address]  [facility] [address]  Phone: [phone] Fax: [phone]  Best Call Back Number: [phone]  Additional Information: patient states he called earlier to get Valtrex refilled for cold sore but no one returned his call.  Please call to advise.  Thanks!

## 2020-01-21 NOTE — TELEPHONE ENCOUNTER
----- Message from Elia JC Epifanio sent at 1/21/2020 11:10 AM CST -----  Contact: same  Patient called in and wanted to know if Dr. Chavarria could call in a Rx for Valtrex 1000 mgs?  Patient stated he prescribed this to him many years ago & was out in the sun yesterday.    [id number] - [place] [address] AT [facility] [address]  [address] [place] [id number]  Phone: [phone] Fax: [phone]    Patient call back number is [phone]

## 2020-04-03 DIAGNOSIS — I10 ESSENTIAL HYPERTENSION: ICD-10-CM

## 2020-04-07 RX ORDER — LISINOPRIL 10 MG/1
10 TABLET ORAL DAILY
Qty: 90 TABLET | Refills: 0 | Status: SHIPPED | OUTPATIENT
Start: 2020-04-07 | End: 2020-08-24

## 2020-04-07 NOTE — PROGRESS NOTES
Refill Authorization Note     is requesting a refill authorization.    Brief assessment and rationale for refill: REVIEW; new restart  Name and strength of medication: lisinopriL 10 MG tablet       Medication Therapy Plan: last prescribed 2018 for12 month supply, adherence data not available due to date written >12 months, pt commented as tking all medications at LOV, please review    Medication reconciliation completed: No                         Comments:   Refill Center Care Gap Closure protocols temporarily suspended.   Requested Prescriptions   Pending Prescriptions Disp Refills    lisinopriL 10 MG tablet 90 tablet 0     Sig: Take 1 tablet (10 mg total) by mouth once daily.       Cardiovascular:  ACE Inhibitors Passed - 4/3/2020  6:10 AM        Passed - Patient is at least 18 years old        Passed - Last BP in normal range within 360 days.     BP Readings from Last 3 Encounters:   08/01/19 120/82   10/29/18 130/82   07/24/18 (!) 142/90              Passed - Office visit in past 12 months or future 90 days.     Recent Outpatient Visits            8 months ago Preventative health care    [facility] Tyrell Chavarria Jr., MD    1 year ago Essential hypertension    [facility] Tyrell Chavarria Jr., MD    1 year ago Hemorrhoids, unspecified hemorrhoid type    [facility] Tyrell Sanon MD    1 year ago Hemorrhoids, unspecified hemorrhoid type    [facility] Tyrell Sanon MD    1 year ago Hemorrhoids, unspecified hemorrhoid type    [facility] Ata Morillo MD                    Passed - Cr is 1.3 or below and within 360 days     Creatinine   Date Value Ref Range Status   07/30/2019 0.9 0.5 - 1.4 mg/dL Final   07/23/2018 0.9 0.5 - 1.4 mg/dL Final   05/11/2018 1.0 0.5 - 1.4 mg/dL Final              Passed - K in normal range and within 360 days     Potassium   Date Value Ref Range Status   07/30/2019 4.4 3.5 -  5.1 mmol/L Final   07/23/2018 4.3 3.5 - 5.1 mmol/L Final   05/11/2018 4.1 3.5 - 5.1 mmol/L Final              Passed - eGFR within 360 days     eGFR if non    Date Value Ref Range Status   07/30/2019 >60.0 >60 mL/min/1.73 m^2 Final     Comment:     Calculation used to obtain the estimated glomerular filtration  rate (eGFR) is the CKD-EPI equation.      07/23/2018 >60.0 >60 mL/min/1.73 m^2 Final     Comment:     Calculation used to obtain the estimated glomerular filtration  rate (eGFR) is the CKD-EPI equation.      05/11/2018 >60.0 >60 mL/min/1.73 m^2 Final     Comment:     Calculation used to obtain the estimated glomerular filtration  rate (eGFR) is the CKD-EPI equation.        eGFR if    Date Value Ref Range Status   07/30/2019 >60.0 >60 mL/min/1.73 m^2 Final   07/23/2018 >60.0 >60 mL/min/1.73 m^2 Final   05/11/2018 >60.0 >60 mL/min/1.73 m^2 Final               Appointments  past 12m or future 3m with PCP    Date Provider   Last Visit   8/1/2019 Tyrell Chavarria Jr., MD   Next Visit   Visit date not found Tyrell Chavarria Jr., MD   .  ED visits in past 90 days: 0       Note composed:12:06 PM 04/07/2020

## 2020-04-07 NOTE — TELEPHONE ENCOUNTER
Please see the following assessment. This refill request still requires some action on your part.     Lisinopril last prescribed for a 12 month supply 8/2018. Outside refill center protocol to renew. Pended for your review. Defer request to you.        Thank you.

## 2020-05-06 ENCOUNTER — PATIENT MESSAGE (OUTPATIENT)
Dept: ADMINISTRATIVE | Facility: HOSPITAL | Age: 65
End: 2020-05-06

## 2020-08-06 ENCOUNTER — LAB VISIT (OUTPATIENT)
Dept: PRIMARY CARE CLINIC | Facility: OTHER | Age: 65
End: 2020-08-06
Attending: INTERNAL MEDICINE
Payer: COMMERCIAL

## 2020-08-06 ENCOUNTER — TELEPHONE (OUTPATIENT)
Dept: FAMILY MEDICINE | Facility: CLINIC | Age: 65
End: 2020-08-06

## 2020-08-06 DIAGNOSIS — Z03.818 ENCOUNTER FOR OBSERVATION FOR SUSPECTED EXPOSURE TO OTHER BIOLOGICAL AGENTS RULED OUT: ICD-10-CM

## 2020-08-06 PROCEDURE — U0003 INFECTIOUS AGENT DETECTION BY NUCLEIC ACID (DNA OR RNA); SEVERE ACUTE RESPIRATORY SYNDROME CORONAVIRUS 2 (SARS-COV-2) (CORONAVIRUS DISEASE [COVID-19]), AMPLIFIED PROBE TECHNIQUE, MAKING USE OF HIGH THROUGHPUT TECHNOLOGIES AS DESCRIBED BY CMS-2020-01-R: HCPCS

## 2020-08-06 NOTE — TELEPHONE ENCOUNTER
Spoke with patient, he stated he was having congestion and a fever of 99.7. I instructed pt to take tylenol as to help with fever and mucinex to help with congestion. Pt was instructed to head to ER if symptoms worsen throughout the night.

## 2020-08-06 NOTE — TELEPHONE ENCOUNTER
----- Message from Rachael Valentine sent at 8/6/2020  3:18 PM CDT -----  Type: Needs Medical Advice  Who Called:  Mike  Symptoms (please be specific):  temp seems to be going up from 97.2 and is now up to 99.2# was tested for covid this morning  How long has patient had these symptoms:  this afternoon  Pharmacy name and phone #:    [facility] #[id number] - [facility] [id number] [place] [address] AT [facility] [address]  [address] [place] [address]  Phone: [phone] Fax: [phone]    Best Call Back Number: [phone]  Additional Information: He wants to know if he should be taking anything at this point. Thank you!

## 2020-08-09 LAB — SARS-COV-2 RNA RESP QL NAA+PROBE: NORMAL

## 2020-08-10 ENCOUNTER — PATIENT OUTREACH (OUTPATIENT)
Dept: ADMINISTRATIVE | Facility: HOSPITAL | Age: 65
End: 2020-08-10

## 2020-08-10 NOTE — PROGRESS NOTES
Health Maintenance Due   Topic Date Due    HIV Screening  1970    TETANUS VACCINE  1974    Shingles Vaccine (1 of 2) 2006       Chart review completed 08/10/2020.  Care Everywhere updates requested and reviewed.  Immunizations reconciled. Media reports reviewed.  Duplicate HM overrides and  orders removed.  Overdue HM topic chart audit and/or requested.  Overdue lab testing linked to upcoming lab appointments if applies.

## 2020-08-21 ENCOUNTER — TELEPHONE (OUTPATIENT)
Dept: FAMILY MEDICINE | Facility: CLINIC | Age: 65
End: 2020-08-21

## 2020-08-21 NOTE — TELEPHONE ENCOUNTER
----- Message from Susan Smiley sent at 8/21/2020  1:58 PM CDT -----  Contact: self  Type: Needs Medical Advice  Who Called:  patient   Best Call Back Number: [phone]  Additional Information: patent is requesting labs orders for his appointment on Monday 08/24/2020, he is questing these orders to include a PSA

## 2020-08-21 NOTE — TELEPHONE ENCOUNTER
----- Message from Carloz Ribeiro sent at 8/21/2020  2:33 PM CDT -----  Contact: pt  Type: Needs Medical Advice  Who Called:  allison    Best Call Back Number: [phone] (home)     Additional Information: pt is unsure why he is going in for the apt on Monday if he is not being allowed to do the labs prior. Please call to advise

## 2020-08-24 ENCOUNTER — OFFICE VISIT (OUTPATIENT)
Dept: FAMILY MEDICINE | Facility: CLINIC | Age: 65
End: 2020-08-24
Payer: COMMERCIAL

## 2020-08-24 ENCOUNTER — LAB VISIT (OUTPATIENT)
Dept: LAB | Facility: HOSPITAL | Age: 65
End: 2020-08-24
Attending: EMERGENCY MEDICINE
Payer: COMMERCIAL

## 2020-08-24 VITALS
DIASTOLIC BLOOD PRESSURE: 90 MMHG | BODY MASS INDEX: 29.76 KG/M2 | OXYGEN SATURATION: 98 % | SYSTOLIC BLOOD PRESSURE: 136 MMHG | HEART RATE: 80 BPM | WEIGHT: 238.13 LBS

## 2020-08-24 DIAGNOSIS — E78.2 MIXED HYPERLIPIDEMIA: Chronic | ICD-10-CM

## 2020-08-24 DIAGNOSIS — Z80.42 FAMILY HISTORY OF PROSTATE CANCER: Chronic | ICD-10-CM

## 2020-08-24 DIAGNOSIS — Z11.4 ENCOUNTER FOR SCREENING FOR HIV: ICD-10-CM

## 2020-08-24 DIAGNOSIS — M54.16 LUMBAR RADICULAR PAIN: ICD-10-CM

## 2020-08-24 DIAGNOSIS — I10 ESSENTIAL HYPERTENSION: Chronic | ICD-10-CM

## 2020-08-24 DIAGNOSIS — I10 ESSENTIAL HYPERTENSION: Primary | Chronic | ICD-10-CM

## 2020-08-24 LAB
ALBUMIN SERPL BCP-MCNC: 4.3 G/DL (ref 3.5–5.2)
ALP SERPL-CCNC: 61 U/L (ref 55–135)
ALT SERPL W/O P-5'-P-CCNC: 23 U/L (ref 10–44)
ANION GAP SERPL CALC-SCNC: 7 MMOL/L (ref 8–16)
AST SERPL-CCNC: 17 U/L (ref 10–40)
BILIRUB SERPL-MCNC: 0.5 MG/DL (ref 0.1–1)
BUN SERPL-MCNC: 18 MG/DL (ref 8–23)
CALCIUM SERPL-MCNC: 9.3 MG/DL (ref 8.7–10.5)
CHLORIDE SERPL-SCNC: 104 MMOL/L (ref 95–110)
CHOLEST SERPL-MCNC: 258 MG/DL (ref 120–199)
CHOLEST/HDLC SERPL: 4.9 {RATIO} (ref 2–5)
CO2 SERPL-SCNC: 29 MMOL/L (ref 23–29)
COMPLEXED PSA SERPL-MCNC: 2 NG/ML (ref 0–4)
CREAT SERPL-MCNC: 0.9 MG/DL (ref 0.5–1.4)
EST. GFR  (AFRICAN AMERICAN): >60 ML/MIN/1.73 M^2
EST. GFR  (NON AFRICAN AMERICAN): >60 ML/MIN/1.73 M^2
GLUCOSE SERPL-MCNC: 112 MG/DL (ref 70–110)
HDLC SERPL-MCNC: 53 MG/DL (ref 40–75)
HDLC SERPL: 20.5 % (ref 20–50)
LDLC SERPL CALC-MCNC: 173.2 MG/DL (ref 63–159)
NONHDLC SERPL-MCNC: 205 MG/DL
POTASSIUM SERPL-SCNC: 4.4 MMOL/L (ref 3.5–5.1)
PROT SERPL-MCNC: 7.3 G/DL (ref 6–8.4)
SODIUM SERPL-SCNC: 140 MMOL/L (ref 136–145)
TRIGL SERPL-MCNC: 159 MG/DL (ref 30–150)

## 2020-08-24 PROCEDURE — 99999 PR PBB SHADOW E&M-EST. PATIENT-LVL IV: ICD-10-PCS | Mod: PBBFAC,,, | Performed by: EMERGENCY MEDICINE

## 2020-08-24 PROCEDURE — 3075F SYST BP GE 130 - 139MM HG: CPT | Mod: CPTII,S$GLB,, | Performed by: EMERGENCY MEDICINE

## 2020-08-24 PROCEDURE — 99999 PR PBB SHADOW E&M-EST. PATIENT-LVL IV: CPT | Mod: PBBFAC,,, | Performed by: EMERGENCY MEDICINE

## 2020-08-24 PROCEDURE — 84153 ASSAY OF PSA TOTAL: CPT

## 2020-08-24 PROCEDURE — 86703 HIV-1/HIV-2 1 RESULT ANTBDY: CPT

## 2020-08-24 PROCEDURE — 80053 COMPREHEN METABOLIC PANEL: CPT

## 2020-08-24 PROCEDURE — 99396 PREV VISIT EST AGE 40-64: CPT | Mod: S$GLB,,, | Performed by: EMERGENCY MEDICINE

## 2020-08-24 PROCEDURE — 3008F PR BODY MASS INDEX (BMI) DOCUMENTED: ICD-10-PCS | Mod: CPTII,S$GLB,, | Performed by: EMERGENCY MEDICINE

## 2020-08-24 PROCEDURE — 3080F PR MOST RECENT DIASTOLIC BLOOD PRESSURE >= 90 MM HG: ICD-10-PCS | Mod: CPTII,S$GLB,, | Performed by: EMERGENCY MEDICINE

## 2020-08-24 PROCEDURE — 3080F DIAST BP >= 90 MM HG: CPT | Mod: CPTII,S$GLB,, | Performed by: EMERGENCY MEDICINE

## 2020-08-24 PROCEDURE — 36415 COLL VENOUS BLD VENIPUNCTURE: CPT | Mod: PO

## 2020-08-24 PROCEDURE — 3075F PR MOST RECENT SYSTOLIC BLOOD PRESS GE 130-139MM HG: ICD-10-PCS | Mod: CPTII,S$GLB,, | Performed by: EMERGENCY MEDICINE

## 2020-08-24 PROCEDURE — 80061 LIPID PANEL: CPT

## 2020-08-24 PROCEDURE — 99396 PR PREVENTIVE VISIT,EST,40-64: ICD-10-PCS | Mod: S$GLB,,, | Performed by: EMERGENCY MEDICINE

## 2020-08-24 PROCEDURE — 3008F BODY MASS INDEX DOCD: CPT | Mod: CPTII,S$GLB,, | Performed by: EMERGENCY MEDICINE

## 2020-08-24 NOTE — PROGRESS NOTES
Subjective:   THIS NOTE IS DONE WITH VOICE RECOGNITION        Patient ID: Mike Ybarra is a 65 y.o. male.    Chief Complaint: Annual Exam      HPI     He is in today for his annual preventive exam, has a number of issues that he would like to talk about.    He has been experiencing some right-sided back pain.  This is thought to be mechanical in nature.  He has been doing some dry needling procedures with good results.  He anticipates continuing to do this.    He remains vegan.  The added fiber has been helpful for his hemorrhoids.  He has minimal symptoms.  He anticipates remaining on this diet.      Here to follow up on blood pressure.  Taking current medications.  He has not been doing many home readings.  He is not experiencing angina or chest pain.  He is taking lisinopril.    BP Readings from Last 3 Encounters:   08/27/20 (!) 148/94   08/24/20 (!) 136/90   08/01/19 120/82     Hyperlipidemia monitoring.  He is not taking any statin medications.    Lab Results   Component Value Date    CHOL 258 (H) 08/24/2020    CHOL 230 (H) 07/30/2019    CHOL 233 (H) 07/23/2018     Lab Results   Component Value Date    HDL 53 08/24/2020    HDL 51 07/30/2019    HDL 49 07/23/2018     Lab Results   Component Value Date    LDLCALC 173.2 (H) 08/24/2020    LDLCALC 149.4 07/30/2019    LDLCALC 156.0 07/23/2018     Lab Results   Component Value Date    TRIG 159 (H) 08/24/2020    TRIG 148 07/30/2019    TRIG 140 07/23/2018     Lab Results   Component Value Date    CHOLHDL 20.5 08/24/2020    CHOLHDL 22.2 07/30/2019    CHOLHDL 21.0 07/23/2018     The 10-year ASCVD risk score (Andresmarlen ESCOTO Jr., et al., 2013) is: 20.1%    Values used to calculate the score:      Age: 65 years      Sex: Male      Is Non- : No      Diabetic: No      Tobacco smoker: No      Systolic Blood Pressure: 148 mmHg      Is BP treated: Yes      HDL Cholesterol: 53 mg/dL      Total Cholesterol: 258 mg/dL    PSA, SCREEN (ng/mL)   Date Value    08/24/2020 2.0         There is no immunization history on file for this patient.  Influenza vaccine recommended this fall.      Current Outpatient Medications   Medication Sig Dispense Refill    lisinopriL 10 MG tablet Take 1 tablet (10 mg total) by mouth once daily. 90 tablet 0    valACYclovir (VALTREX) 1000 MG tablet Take 1 tablet (1,000 mg total) by mouth 2 (two) times daily. 4 tablet 0    tadalafil (CIALIS) 20 MG Tab Take 1 tablet (20 mg total) by mouth once daily. (Patient not taking: Reported on 8/24/2020) 6 tablet 2     No current facility-administered medications for this visit.          Review of Systems   Constitutional: Negative for activity change, chills, fever and unexpected weight change.   HENT: Negative for hearing loss, nosebleeds and tinnitus.    Eyes: Negative for discharge and itching.   Respiratory: Negative for cough, choking, chest tightness, shortness of breath and wheezing.    Cardiovascular: Negative for chest pain, palpitations and leg swelling.   Gastrointestinal: Negative for abdominal distention.   Genitourinary: Negative for difficulty urinating, dysuria, flank pain, hematuria and urgency.   Musculoskeletal: Positive for back pain. Negative for arthralgias and joint swelling.   Skin: Negative.    Neurological: Negative for dizziness, tremors, seizures, numbness and headaches.   Psychiatric/Behavioral: Negative for confusion, dysphoric mood and hallucinations.       Objective:      Physical Exam  Vitals signs reviewed.   Constitutional:       General: He is not in acute distress.     Appearance: He is well-developed.   HENT:      Head: Normocephalic and atraumatic.      Right Ear: Tympanic membrane and external ear normal.      Left Ear: Tympanic membrane and external ear normal.      Nose: Nose normal.      Mouth/Throat:      Mouth: Mucous membranes are moist.      Pharynx: Oropharynx is clear. No oropharyngeal exudate.   Eyes:      General: No scleral icterus.      Conjunctiva/sclera: Conjunctivae normal.      Pupils: Pupils are equal, round, and reactive to light.   Neck:      Musculoskeletal: Normal range of motion and neck supple.      Thyroid: No thyromegaly.   Cardiovascular:      Rate and Rhythm: Normal rate and regular rhythm.      Heart sounds: Normal heart sounds. No murmur.      Comments: Repeat blood pressure remained elevated during the exam.  Pulmonary:      Effort: Pulmonary effort is normal.      Breath sounds: Normal breath sounds. No wheezing or rales.   Abdominal:      General: Bowel sounds are normal. There is no distension.      Palpations: Abdomen is soft.      Tenderness: There is no abdominal tenderness.   Musculoskeletal: Normal range of motion.         General: No tenderness.      Comments: Direct straight leg raising is negative bilaterally.    No evidence of hip involvement.    No bony tenderness.   Lymphadenopathy:      Cervical: No cervical adenopathy.   Skin:     General: Skin is warm and dry.      Capillary Refill: Capillary refill takes less than 2 seconds.      Findings: No rash.   Neurological:      Mental Status: He is alert and oriented to person, place, and time.      Cranial Nerves: Cranial nerves are intact.      Sensory: Sensation is intact.      Motor: Motor function is intact. No abnormal muscle tone.      Coordination: Coordination normal.      Deep Tendon Reflexes: Reflexes are normal and symmetric.      Reflex Scores:       Brachioradialis reflexes are 2+ on the right side and 2+ on the left side.       Patellar reflexes are 2+ on the right side and 2+ on the left side.       Achilles reflexes are 2+ on the right side and 2+ on the left side.  Psychiatric:         Mood and Affect: Mood normal.         Behavior: Behavior normal.         Thought Content: Thought content normal.         Judgment: Judgment normal.         Assessment:       1. Essential hypertension    2. Mixed hyperlipidemia    3. Family history of prostate cancer    4.  Lumbar radicular pain        Plan:       1. Essential hypertension  Not at target  Increase lisinopril to 20 milligrams a day  Follow-up nursing visit in 2 weeks.    - lisinopriL (PRINIVIL,ZESTRIL) 20 MG Tab; Take 1 tablet (20 mg total) by mouth once daily.  Dispense: 90 tablet; Refill: 3    2. Mixed hyperlipidemia  Results of his lipid panel were reviewed with him.  He would rather focus on diet and exercise before taking any medications.  Risk factors calculated.  Repeat lipid profile in 3 months.    3. Family history of prostate cancer  Continue to monitor PSA annually given his family history    4. Lumbar radicular pain  Focus on stretching and strengthening recommended  Negative direct straight leg raising exam.  If not improving after 2 or 3 weeks, he will get back to me.

## 2020-08-25 ENCOUNTER — PATIENT MESSAGE (OUTPATIENT)
Dept: FAMILY MEDICINE | Facility: CLINIC | Age: 65
End: 2020-08-25

## 2020-08-25 LAB — HIV 1+2 AB+HIV1 P24 AG SERPL QL IA: NEGATIVE

## 2020-08-26 ENCOUNTER — PATIENT MESSAGE (OUTPATIENT)
Dept: FAMILY MEDICINE | Facility: CLINIC | Age: 65
End: 2020-08-26

## 2020-08-26 DIAGNOSIS — E78.2 MIXED HYPERLIPIDEMIA: Primary | Chronic | ICD-10-CM

## 2020-08-27 ENCOUNTER — OFFICE VISIT (OUTPATIENT)
Dept: FAMILY MEDICINE | Facility: CLINIC | Age: 65
End: 2020-08-27
Payer: COMMERCIAL

## 2020-08-27 VITALS
OXYGEN SATURATION: 97 % | HEART RATE: 127 BPM | DIASTOLIC BLOOD PRESSURE: 94 MMHG | HEIGHT: 75 IN | SYSTOLIC BLOOD PRESSURE: 148 MMHG | BODY MASS INDEX: 29.52 KG/M2 | WEIGHT: 237.44 LBS

## 2020-08-27 DIAGNOSIS — S33.5XXS LUMBAR SPRAIN, SEQUELA: Primary | ICD-10-CM

## 2020-08-27 DIAGNOSIS — E78.5 HYPERLIPIDEMIA, UNSPECIFIED HYPERLIPIDEMIA TYPE: ICD-10-CM

## 2020-08-27 PROCEDURE — 99999 PR PBB SHADOW E&M-EST. PATIENT-LVL III: ICD-10-PCS | Mod: PBBFAC,,, | Performed by: FAMILY MEDICINE

## 2020-08-27 PROCEDURE — 3008F PR BODY MASS INDEX (BMI) DOCUMENTED: ICD-10-PCS | Mod: CPTII,S$GLB,, | Performed by: FAMILY MEDICINE

## 2020-08-27 PROCEDURE — 99999 PR PBB SHADOW E&M-EST. PATIENT-LVL III: CPT | Mod: PBBFAC,,, | Performed by: FAMILY MEDICINE

## 2020-08-27 PROCEDURE — 99213 PR OFFICE/OUTPT VISIT, EST, LEVL III, 20-29 MIN: ICD-10-PCS | Mod: S$GLB,,, | Performed by: FAMILY MEDICINE

## 2020-08-27 PROCEDURE — 3008F BODY MASS INDEX DOCD: CPT | Mod: CPTII,S$GLB,, | Performed by: FAMILY MEDICINE

## 2020-08-27 PROCEDURE — 99213 OFFICE O/P EST LOW 20 MIN: CPT | Mod: S$GLB,,, | Performed by: FAMILY MEDICINE

## 2020-08-27 PROCEDURE — 3077F PR MOST RECENT SYSTOLIC BLOOD PRESSURE >= 140 MM HG: ICD-10-PCS | Mod: CPTII,S$GLB,, | Performed by: FAMILY MEDICINE

## 2020-08-27 PROCEDURE — 3080F PR MOST RECENT DIASTOLIC BLOOD PRESSURE >= 90 MM HG: ICD-10-PCS | Mod: CPTII,S$GLB,, | Performed by: FAMILY MEDICINE

## 2020-08-27 PROCEDURE — 3080F DIAST BP >= 90 MM HG: CPT | Mod: CPTII,S$GLB,, | Performed by: FAMILY MEDICINE

## 2020-08-27 PROCEDURE — 3077F SYST BP >= 140 MM HG: CPT | Mod: CPTII,S$GLB,, | Performed by: FAMILY MEDICINE

## 2020-08-27 RX ORDER — METHYLPREDNISOLONE 4 MG/1
TABLET ORAL
Qty: 1 PACKAGE | Refills: 0 | Status: SHIPPED | OUTPATIENT
Start: 2020-08-27 | End: 2020-09-17

## 2020-08-27 NOTE — PROGRESS NOTES
Subjective:       Patient ID: Mike Ybarra is a 65 y.o. male.    Chief Complaint: Results (second opinion)    Want another opinion regarding his labs    C/o back pain since last Friday.  He has been doing more yardwork lately.  He has h/o lumbar DDD.  Pain is 7/10.  Using Advil every 4 hours.  He did use Medrol in the past with good relief.  He is working with [email or web address] with dry needling.    Following vegetarian diet and working on weight loss    Repeat labs are arranged in 3 months    He is working as a teacher.      Past Medical History:   Diagnosis Date    Hyperlipemia     Internal hemorrhoids     Lumbar disc disease     intemittent flares       Past Surgical History:   Procedure Laterality Date    COLONOSCOPY  11/15/2006  [facility]    Internal small hemorrhoids were found.  Non-thrombosed external hemorrhoids. Perianal skin tag.  Redundant left colon.     HEMORRHOID SURGERY  12/15/2006  [facility]    HERNIA REPAIR      umbilical    TONSILLECTOMY, ADENOIDECTOMY         Review of patient's allergies indicates:   Allergen Reactions    Avocado (laurus persea)      Inside felt very sick       Social History     Socioeconomic History    Marital status:      Spouse name: Not on file    Number of children: 5    Years of education: Not on file    Highest education level: Not on file   Occupational History    Occupation: educator     Employer: [employer]   Social Needs    Financial resource strain: Not very hard    Food insecurity     Worry: Never true     Inability: Never true    Transportation needs     Medical: No     Non-medical: No   Tobacco Use    Smoking status: Former Smoker     Quit date: 1998     Years since quittin.3    Smokeless tobacco: Never Used   Substance and Sexual Activity    Alcohol use: Yes     Frequency: Never     Binge frequency: Never     Comment: rare    Drug use: No    Sexual activity: Yes     Partners: Female   Lifestyle    Physical activity      Days per week: 4 days     Minutes per session: 40 min    Stress: Only a little   Relationships    Social connections     Talks on phone: Three times a week     Gets together: Once a week     Attends Shinto service: Not on file     Active member of club or organization: Yes     Attends meetings of clubs or organizations: 1 to 4 times per year     Relationship status:    Other Topics Concern    Not on file   Social History Narrative    Significant yard work.  No formal exercise       Current Outpatient Medications on File Prior to Visit   Medication Sig Dispense Refill    lisinopriL (PRINIVIL,ZESTRIL) 20 MG Tab Take 1 tablet (20 mg total) by mouth once daily. 90 tablet 3    valACYclovir (VALTREX) 1000 MG tablet Take 1 tablet (1,000 mg total) by mouth 2 (two) times daily. 4 tablet 0    tadalafil (CIALIS) 20 MG Tab Take 1 tablet (20 mg total) by mouth once daily. (Patient not taking: Reported on 8/24/2020) 6 tablet 2     No current facility-administered medications on file prior to visit.        Family History   Problem Relation Age of Onset    Cancer Father 77        prostate    Gout Brother     Cancer Brother         Skin    Diabetes Mother     Stroke Mother     Heart disease Mother     Heart attack Paternal Grandfather        Review of Systems   Constitutional: Negative for appetite change, chills, fever and unexpected weight change.   HENT: Negative for sore throat and trouble swallowing.    Eyes: Negative for pain and visual disturbance.   Respiratory: Negative for cough, chest tightness, shortness of breath and wheezing.    Cardiovascular: Negative for chest pain, palpitations and leg swelling.   Gastrointestinal: Negative for abdominal pain, blood in stool, constipation, diarrhea and nausea.   Genitourinary: Negative for difficulty urinating, dysuria and hematuria.   Musculoskeletal: Positive for back pain. Negative for arthralgias, gait problem and neck pain.   Skin: Negative for rash and  "wound.   Neurological: Negative for dizziness, weakness, light-headedness and headaches.   Hematological: Negative for adenopathy.   Psychiatric/Behavioral: Negative for dysphoric mood.       Objective:      BP (!) 148/94   Pulse (!) 127   Ht 6' 3" (1.905 m)   Wt 107.7 kg (237 lb 7 oz)   SpO2 97%   BMI 29.68 kg/m²   Physical Exam  Constitutional:       General: He is not in acute distress.     Appearance: He is well-developed.   HENT:      Head: Normocephalic and atraumatic.      Right Ear: External ear normal.      Left Ear: External ear normal.      Mouth/Throat:      Pharynx: Uvula midline. No oropharyngeal exudate.   Eyes:      General: Lids are normal.      Conjunctiva/sclera: Conjunctivae normal.      Pupils: Pupils are equal, round, and reactive to light.   Neck:      Musculoskeletal: Full passive range of motion without pain, normal range of motion and neck supple.      Thyroid: No thyroid mass or thyromegaly.      Trachea: Phonation normal.   Cardiovascular:      Rate and Rhythm: Normal rate and regular rhythm.      Heart sounds: Normal heart sounds. No murmur. No friction rub. No gallop.    Pulmonary:      Effort: Pulmonary effort is normal. No respiratory distress.      Breath sounds: Normal breath sounds. No wheezing or rales.   Musculoskeletal: Normal range of motion.   Lymphadenopathy:      Cervical: No cervical adenopathy.   Skin:     General: Skin is warm and dry.   Neurological:      Mental Status: He is alert and oriented to person, place, and time.      Cranial Nerves: No cranial nerve deficit.      Coordination: Coordination normal.   Psychiatric:         Speech: Speech normal.         Behavior: Behavior normal.         Thought Content: Thought content normal.         Judgment: Judgment normal.         Results for orders placed or performed in visit on 08/24/20   PSA, Screening   Result Value Ref Range    PSA, SCREEN 2.0 0.00 - 4.00 ng/mL   Lipid Panel   Result Value Ref Range    Cholesterol " 258 (H) 120 - 199 mg/dL    Triglycerides 159 (H) 30 - 150 mg/dL    HDL 53 40 - 75 mg/dL    LDL Cholesterol 173.2 (H) 63.0 - 159.0 mg/dL    Hdl/Cholesterol Ratio 20.5 20.0 - 50.0 %    Total Cholesterol/HDL Ratio 4.9 2.0 - 5.0    Non-HDL Cholesterol 205 mg/dL   Comprehensive metabolic panel   Result Value Ref Range    Sodium 140 136 - 145 mmol/L    Potassium 4.4 3.5 - 5.1 mmol/L    Chloride 104 95 - 110 mmol/L    CO2 29 23 - 29 mmol/L    Glucose 112 (H) 70 - 110 mg/dL    BUN, Bld 18 8 - 23 mg/dL    Creatinine 0.9 0.5 - 1.4 mg/dL    Calcium 9.3 8.7 - 10.5 mg/dL    Total Protein 7.3 6.0 - 8.4 g/dL    Albumin 4.3 3.5 - 5.2 g/dL    Total Bilirubin 0.5 0.1 - 1.0 mg/dL    Alkaline Phosphatase 61 55 - 135 U/L    AST 17 10 - 40 U/L    ALT 23 10 - 44 U/L    Anion Gap 7 (L) 8 - 16 mmol/L    eGFR if African American >60.0 >60 mL/min/1.73 m^2    eGFR if non African American >60.0 >60 mL/min/1.73 m^2   HIV 1/2 Ag/Ab (4th Gen)   Result Value Ref Range    HIV 1/2 Ag/Ab Negative Negative       Assessment:       1. Lumbar sprain, sequela    2. Hyperlipidemia, unspecified hyperlipidemia type        Plan:       Lumbar sprain, sequela  -     methylPREDNISolone (MEDROL DOSEPACK) 4 mg tablet; use as directed  Dispense: 1 Package; Refill: 0    Hyperlipidemia, unspecified hyperlipidemia type      continue back stretching, PT; medrol pack to assist with inflamation component of pain per PT recommendation  Agree with advice given from Dr. Chavarria regarding his labs  Work on low fat diet and weight loss with repeat lipids in 3 months as planned  Repeat PSA annually; rise in PSA appears appropriate for age  Counseled on regular exercise, maintenance of a healthy weight, balanced diet rich in fruits/vegetables and lean protein, and avoidance of unhealthy habits like smoking and excessive alcohol intake.

## 2020-08-30 NOTE — PATIENT INSTRUCTIONS
Mike,    We did increase your blood pressure medication 20 milligrams a day.  We do need to follow-up to make sure that your blood pressure has improved.  A nursing visit should have been arranged prior to your to part in the office.    I would recommend repeating your lipid profile, as we discussed during the reporting of your results.  The goal here is to have you never have to see Cardiology orient up in the emergency room with any acute coronary symptoms.    Influenza vaccine recommended this fall.    Respectfully,    Tyrell Chavarria MD

## 2020-09-02 ENCOUNTER — PATIENT OUTREACH (OUTPATIENT)
Dept: ADMINISTRATIVE | Facility: OTHER | Age: 65
End: 2020-09-02

## 2020-09-02 ENCOUNTER — TELEPHONE (OUTPATIENT)
Dept: PHYSICAL MEDICINE AND REHAB | Facility: CLINIC | Age: 65
End: 2020-09-02

## 2020-09-02 NOTE — PROGRESS NOTES
Health Maintenance Due   Topic Date Due    TETANUS VACCINE  04/22/1973    Shingles Vaccine (1 of 2) 04/22/2006    Influenza Vaccine (1) 09/01/2020     Updates were requested from care everywhere.  Chart was reviewed for overdue Proactive [facility] Encounters (OPAL) topics (CRS, Breast Cancer Screening, Eye exam)  Health Maintenance has been updated.  [facility] immunization registry triggered.  Immunizations were reconciled.

## 2020-09-08 ENCOUNTER — OFFICE VISIT (OUTPATIENT)
Dept: SPINE | Facility: CLINIC | Age: 65
End: 2020-09-08
Payer: COMMERCIAL

## 2020-09-08 VITALS
DIASTOLIC BLOOD PRESSURE: 89 MMHG | HEIGHT: 75 IN | SYSTOLIC BLOOD PRESSURE: 145 MMHG | BODY MASS INDEX: 29.4 KG/M2 | HEART RATE: 69 BPM | WEIGHT: 236.44 LBS

## 2020-09-08 DIAGNOSIS — M54.41 ACUTE RIGHT-SIDED LOW BACK PAIN WITH RIGHT-SIDED SCIATICA: Primary | ICD-10-CM

## 2020-09-08 PROCEDURE — 99999 PR PBB SHADOW E&M-EST. PATIENT-LVL III: CPT | Mod: PBBFAC,,, | Performed by: PHYSICIAN ASSISTANT

## 2020-09-08 PROCEDURE — 99203 OFFICE O/P NEW LOW 30 MIN: CPT | Mod: S$GLB,,, | Performed by: PHYSICIAN ASSISTANT

## 2020-09-08 PROCEDURE — 3077F SYST BP >= 140 MM HG: CPT | Mod: CPTII,S$GLB,, | Performed by: PHYSICIAN ASSISTANT

## 2020-09-08 PROCEDURE — 3008F BODY MASS INDEX DOCD: CPT | Mod: CPTII,S$GLB,, | Performed by: PHYSICIAN ASSISTANT

## 2020-09-08 PROCEDURE — 3008F PR BODY MASS INDEX (BMI) DOCUMENTED: ICD-10-PCS | Mod: CPTII,S$GLB,, | Performed by: PHYSICIAN ASSISTANT

## 2020-09-08 PROCEDURE — 99999 PR PBB SHADOW E&M-EST. PATIENT-LVL III: ICD-10-PCS | Mod: PBBFAC,,, | Performed by: PHYSICIAN ASSISTANT

## 2020-09-08 PROCEDURE — 3079F PR MOST RECENT DIASTOLIC BLOOD PRESSURE 80-89 MM HG: ICD-10-PCS | Mod: CPTII,S$GLB,, | Performed by: PHYSICIAN ASSISTANT

## 2020-09-08 PROCEDURE — 99203 PR OFFICE/OUTPT VISIT, NEW, LEVL III, 30-44 MIN: ICD-10-PCS | Mod: S$GLB,,, | Performed by: PHYSICIAN ASSISTANT

## 2020-09-08 PROCEDURE — 3077F PR MOST RECENT SYSTOLIC BLOOD PRESSURE >= 140 MM HG: ICD-10-PCS | Mod: CPTII,S$GLB,, | Performed by: PHYSICIAN ASSISTANT

## 2020-09-08 PROCEDURE — 3079F DIAST BP 80-89 MM HG: CPT | Mod: CPTII,S$GLB,, | Performed by: PHYSICIAN ASSISTANT

## 2020-09-08 RX ORDER — NAPROXEN 500 MG/1
500 TABLET ORAL 2 TIMES DAILY WITH MEALS
Qty: 30 TABLET | Refills: 0 | Status: SHIPPED | OUTPATIENT
Start: 2020-09-08 | End: 2020-09-21 | Stop reason: SDUPTHER

## 2020-09-08 RX ORDER — TIZANIDINE 4 MG/1
4 TABLET ORAL NIGHTLY PRN
Qty: 40 TABLET | Refills: 0 | Status: SHIPPED | OUTPATIENT
Start: 2020-09-08 | End: 2020-10-08 | Stop reason: SDUPTHER

## 2020-09-08 RX ORDER — IBUPROFEN 200 MG
400 TABLET ORAL EVERY 6 HOURS PRN
COMMUNITY
End: 2020-09-25

## 2020-09-08 NOTE — PROGRESS NOTES
Back and Spine Consult    Patient ID: Mike Ybarra is a 65 y.o. male.    Chief Complaint   Patient presents with    Low-back Pain     He has had back pain off and on for years. Now he has right-sided low back pain and numbness in his right lower extremity. The pain started this time 5 wks ago & 2&1/2 wks ago it started down his right leg. He is doing PT with dry needling and in the pool. He is walking with a cane.       Review of Systems   Constitutional: Negative for activity change, chills, fatigue and unexpected weight change.   HENT: Negative for hearing loss, tinnitus, trouble swallowing and voice change.    Eyes: Negative for visual disturbance.   Respiratory: Negative for apnea, chest tightness and shortness of breath.    Cardiovascular: Negative for chest pain and palpitations.   Gastrointestinal: Negative for abdominal pain, constipation, diarrhea, nausea and vomiting.   Genitourinary: Negative for difficulty urinating, dysuria and frequency.   Musculoskeletal: Positive for back pain. Negative for gait problem, neck pain and neck stiffness.   Skin: Negative for wound.   Neurological: Negative for dizziness, tremors, seizures, facial asymmetry, speech difficulty, weakness, light-headedness, numbness and headaches.   Psychiatric/Behavioral: Negative for confusion and decreased concentration.       Past Medical History:   Diagnosis Date    Hyperlipemia     Internal hemorrhoids     Lumbar disc disease     intemittent flares     Social History     Socioeconomic History    Marital status:      Spouse name: Not on file    Number of children: 5    Years of education: Not on file    Highest education level: Not on file   Occupational History    Occupation: educator     Employer: [employer]   Social Needs    Financial resource strain: Not very hard    Food insecurity     Worry: Never true     Inability: Never true    Transportation needs     Medical: No     Non-medical: No    Tobacco Use    Smoking status: Former Smoker     Quit date: 1998     Years since quittin.4    Smokeless tobacco: Never Used   Substance and Sexual Activity    Alcohol use: Yes     Frequency: Never     Binge frequency: Never     Comment: rare    Drug use: No    Sexual activity: Yes     Partners: Female   Lifestyle    Physical activity     Days per week: 4 days     Minutes per session: 40 min    Stress: Only a little   Relationships    Social connections     Talks on phone: Three times a week     Gets together: Once a week     Attends [employer]ism service: Not on file     Active member of club or organization: Yes     Attends meetings of clubs or organizations: 1 to 4 times per year     Relationship status:    Other Topics Concern    Not on file   Social History Narrative    Significant yard work.  No formal exercise     Family History   Problem Relation Age of Onset    Cancer Father 77        prostate    Gout Brother     Cancer Brother         Skin    Diabetes Mother     Stroke Mother     Heart disease Mother     Heart attack Paternal Grandfather      Review of patient's allergies indicates:   Allergen Reactions    Avocado (laurus persea)      Inside felt very sick       Current Outpatient Medications:     ibuprofen (ADVIL,MOTRIN) 200 MG tablet, Take 400 mg by mouth every 6 (six) hours as needed for Pain., Disp: , Rfl:     lisinopriL (PRINIVIL,ZESTRIL) 20 MG Tab, Take 1 tablet (20 mg total) by mouth once daily., Disp: 90 tablet, Rfl: 3    valACYclovir (VALTREX) 1000 MG tablet, Take 1 tablet (1,000 mg total) by mouth 2 (two) times daily., Disp: 4 tablet, Rfl: 0    methylPREDNISolone (MEDROL DOSEPACK) 4 mg tablet, use as directed (Patient not taking: Reported on 2020), Disp: 1 Package, Rfl: 0    naproxen (EC-NAPROSYN) 500 MG EC tablet, Take 1 tablet (500 mg total) by mouth 2 (two) times daily with meals., Disp: 30 tablet, Rfl: 0    tadalafil (CIALIS) 20 MG Tab, Take 1 tablet  "(20 mg total) by mouth once daily. (Patient not taking: Reported on 8/24/2020), Disp: 6 tablet, Rfl: 2    tiZANidine (ZANAFLEX) 4 MG tablet, Take 1 tablet (4 mg total) by mouth nightly as needed (muscle spasms/ pain)., Disp: 40 tablet, Rfl: 0    Vitals:    09/08/20 1200   BP: (!) 145/89   BP Location: Right arm   Patient Position: Sitting   BP Method: Large (Automatic)   Pulse: 69   Weight: 107.2 kg (236 lb 7.1 oz)   Height: 6' 3" (1.905 m)       Physical Exam  Vitals signs and nursing note reviewed.   Constitutional:       Appearance: He is well-developed.   HENT:      Head: Normocephalic and atraumatic.   Eyes:      Pupils: Pupils are equal, round, and reactive to light.   Neck:      Musculoskeletal: Normal range of motion and neck supple.   Cardiovascular:      Rate and Rhythm: Normal rate.   Pulmonary:      Effort: Pulmonary effort is normal.   Abdominal:      General: There is no distension.   Musculoskeletal: Normal range of motion.   Skin:     General: Skin is warm and dry.   Neurological:      Mental Status: He is alert and oriented to person, place, and time.      Coordination: Finger-Nose-Finger Test, Heel to Shin Test and Romberg Test normal.      Gait: Gait is intact. Tandem walk normal.      Deep Tendon Reflexes:      Reflex Scores:       Tricep reflexes are 2+ on the right side and 2+ on the left side.       Bicep reflexes are 2+ on the right side and 2+ on the left side.       Brachioradialis reflexes are 2+ on the right side and 2+ on the left side.       Patellar reflexes are 2+ on the right side and 2+ on the left side.       Achilles reflexes are 2+ on the right side and 2+ on the left side.  Psychiatric:         Speech: Speech normal.         Behavior: Behavior normal.         Thought Content: Thought content normal.         Judgment: Judgment normal.         Neurologic Exam     Mental Status   Oriented to person, place, and time.   Oriented to person.   Oriented to place.   Oriented to time. "   Follows 3 step commands.   Attention: normal. Concentration: normal.   Speech: speech is normal   Level of consciousness: alert  Knowledge: consistent with education.   Able to name object. Able to read. Able to repeat. Able to write. Normal comprehension.     Cranial Nerves     CN II   Visual acuity: normal  Right visual field deficit: none  Left visual field deficit: none     CN III, IV, VI   Pupils are equal, round, and reactive to light.  Right pupil: Size: 3 mm. Shape: regular. Reactivity: brisk. Consensual response: intact.   Left pupil: Size: 3 mm. Shape: regular. Reactivity: brisk. Consensual response: intact.   CN III: no CN III palsy  CN VI: no CN VI palsy  Nystagmus: none   Diplopia: none  Ophthalmoparesis: none  Conjugate gaze: present    CN V   Right facial sensation deficit: none  Left facial sensation deficit: none    CN VII   Right facial weakness: none  Left facial weakness: none    CN VIII   Hearing: intact    CN IX, X   CN IX normal.   CN X normal.     CN XI   Right sternocleidomastoid strength: normal  Left sternocleidomastoid strength: normal  Right trapezius strength: normal  Left trapezius strength: normal    CN XII   Fasciculations: absent  Tongue deviation: none    Motor Exam   Muscle bulk: normal  Overall muscle tone: normal  Right arm pronator drift: absent  Left arm pronator drift: absent    Strength   Right neck flexion: 5/5  Left neck flexion: 5/5  Right neck extension: 5/5  Left neck extension: 5/5  Right deltoid: 5/5  Left deltoid: 5/5  Right biceps: 5/5  Left biceps: 5/5  Right triceps: 5/5  Left triceps: 5/5  Right wrist flexion: 5/5  Left wrist flexion: 5/5  Right wrist extension: 5/5  Left wrist extension: 5/5  Right interossei: 5/5  Left interossei: 5/5  Right abdominals: 5/5  Left abdominals: 5/5  Right iliopsoas: 5/5  Left iliopsoas: 5/5  Right quadriceps: 5/5  Left quadriceps: 5/5  Right hamstrin/5  Left hamstrin/5  Right glutei: 5/5  Left glutei: 5/5  Right anterior  tibial: 5/5  Left anterior tibial: 5/5  Right posterior tibial: 5/5  Left posterior tibial: 5/5  Right peroneal: 5/5  Left peroneal: 5/5  Right gastroc: 5/5  Left gastroc: 5/5    Sensory Exam   Right arm light touch: normal  Left arm light touch: normal  Right leg light touch: normal  Left leg light touch: normal  Right arm vibration: normal  Left arm vibration: normal  Right arm pinprick: normal  Left arm pinprick: normal    Gait, Coordination, and Reflexes     Gait  Gait: normal    Coordination   Romberg: negative  Finger to nose coordination: normal  Heel to shin coordination: normal  Tandem walking coordination: normal    Tremor   Resting tremor: absent  Intention tremor: absent  Action tremor: absent    Reflexes   Right brachioradialis: 2+  Left brachioradialis: 2+  Right biceps: 2+  Left biceps: 2+  Right triceps: 2+  Left triceps: 2+  Right patellar: 2+  Left patellar: 2+  Right achilles: 2+  Left achilles: 2+  Right Prescott: absent  Left Prescott: absent  Right ankle clonus: absent  Left ankle clonus: absent      Provider dictation:  65 year old male with hypertension and hyperlipidemia is self referred for evaluation of back pain.  He has had intermittent back pain for 10 years typically affecting left side.  Current exacerbation started approximately 5 weeks ago and affecting the right side.  He has right-sided lower back pain associated with tightness into the right anterior groin and thigh.  He also experiences numbness into the right shin.  Overall symptoms are improving, but not completely resolved.    Current medications:  Muscle relaxant from his wife, advil  Medications tried: medrol dose pack started 8/27/20  Physical therapy: attending at [facility]  Interventional Procedures: none.    Oswestry score: 70%.  PHQ:  5.    On exam, he is neurologically intact ith 5/5 strength, 2+ DTR and no sensory deficits.  Gait and station fluid. Denies bowel/ bladder dysfunction.      MRI lumbar spine  from 2011 - I have report only.  Report describes L2/3 left foraminal disk with left foraminal narrowing and left lateral recess stenosis.  Mild L5/S1 disk paracentral to the left.  Mild degenerative changes throughout.    In conclusion, Mr. Ybarra has chronic left intermittent back pain with acte exacerbation of right sided lower back and thigh pain - possibly right L3 radiculopathy.  He has taken NSAIDs, steroids, and has been undergoing physical therapy without resolution.  Next steps in care would be considering MRI and epidural steroid injections.  He is not anxious for a therapies.  He would prefer to continue to try medications and therapy for a bit longer since symptoms are improving.  I am prescribing Zanaflex and Naprosyn to help with muscle spasms and pain respectively.  He should follow-up with no continued improvement or for worsening symptoms.      Visit Diagnosis:  Acute right-sided low back pain with right-sided sciatica  -     tiZANidine (ZANAFLEX) 4 MG tablet; Take 1 tablet (4 mg total) by mouth nightly as needed (muscle spasms/ pain).  Dispense: 40 tablet; Refill: 0  -     naproxen (EC-NAPROSYN) 500 MG EC tablet; Take 1 tablet (500 mg total) by mouth 2 (two) times daily with meals.  Dispense: 30 tablet; Refill: 0        Total time spent counseling greater than fifty percent of total visit time.  Counseling included discussion regarding imaging findings, diagnosis possibilities, treatment options, risks and benefits.   The patient had many questions regarding the options and long-term effects.

## 2020-09-08 NOTE — PROGRESS NOTES
Hello! We are asking you to complete following questionnaire prior to your upcoming virtual visit with Sharon Lyod. This questionnaire has been designed to give us information on how your low back or leg pain has affected your ability to manage everyday life. Please respond with only ONE number answer to each question which best applies to you TODAY. This will need to be completed and sent back prior to checking in for your appointment.    EXAMPLE OF RESPONSE:  Q1: 2       Q2: 1      Q3: 4     Q1: Pain Intensity 3   0 - No pain  1 - Very Mild pain   2 - Moderate Pain  3 - Fairly Severe Pain  4 - Very Severe Pain  5 - Worst Imaginable Pain    Q2: Personal Care (washing, dressing, etc) 1  0 - I can look after myself normally without causing extra pain  1 - I can look after myself normally but it causes extra pain  2 - It is painful to look after myself and I am slow and careful  3 - I need some help but manage most of my personal care   4 - I need help everyday in most aspects of self-care   5 - I do not get dressed, I wash with difficulty and stay in bed    Q3: Lifting 5  0 - I can lift heavy weights without extra pain  1 - I can lift heavy weights but it gives extra pain   2 - Pain prevents me from lifting heavy weights off the floor, but I can manage if they are conveniently placed (eg. on a table)  3 - Pain prevents me from lifting heavy weights, but I can manage light to medium weights if they are conveniently positioned   4 - I can lift very light weights   5 - I cannot lift or carry anything at all     Q4: Walking 4  0 - Pain does not prevent me walking any distance   1 - Pain prevents me from walking more than 1 mile  2 - Pain prevents me from walking more than 1/2 mile  3 - Pain prevents me from walking more than 100 yards  4 - I can only walk using a stick or crutches  5 - I am in bed most of the time    Q5: Sitting 1  0 - I can sit in any chair as long as I like   1 - I can only sit in my favorite chair  as long as I like   2 - Pain prevents me from sitting for more than one hour   3 - Pain prevents me from sitting for more than 30 minutes   4 - Pain prevents me from sitting for more than 10 minutes   5 - Pain prevents me from sitting at all     Q6: Standing 4  0 - I can stand as long as I want without extra pain  1 - I can stand as long as I want but it gives me extra pain  2 - Pain prevents me from standing for more than 1 hour   3 - Pain prevents me from standing for more than 30 minutes   4 - Pain prevents me from standing for more than 10 minutes   5 - Pain prevents me from standing at all     Q7: Sleeping 3   0 - My sleep is never disturbed by pain   1 - My sleep is occasionally disturbed by pain   2 - Because of pain, I have less than 6 hours of sleep  3 - Because of pain, I have less than 4 hours of sleep  4 - Because of pain, I have less than 2 hours of sleep  5 - Pain prevents me from sleeping at all    Q8: Sex Life (if applicable) 5  0 - My sex life is normal and causes no extra pain  1 - My sex life is normal but causes some extra pain   2 - My sex life is nearly normal but is very painful   3 - My sex life is severely restricted by pain  4 - My sex life is nearly absent because of pain  5 - Pain prevents any sex life at all    Q9: Social Life 5  0 - My social life is normal and gives me no extra pain  1 - My social life is normal but increases the degree of pain  2 - Pain has no significant effect on my social life apart from limiting my more energetic interests such as sports   3 - Pain has restricted my social life and I do not go out as often   4 - Pain has restricted my social life to my house  5 - I have no social life because of pain    Q10: Traveling 5   0 - I can travel anywhere without pain  1 - I can travel anywhere but it gives me extra pain   2 - Pain is bad but I manage journeys over 2 hours   3 - Pain restricts me to journeys of less than 1 hour  4 - Pain restricts me to short necessary  journeys under 30 minutes   5 - Pain prevents me from traveling except to receive treatment

## 2020-09-15 ENCOUNTER — PATIENT OUTREACH (OUTPATIENT)
Dept: ADMINISTRATIVE | Facility: HOSPITAL | Age: 65
End: 2020-09-15

## 2020-09-16 ENCOUNTER — PATIENT MESSAGE (OUTPATIENT)
Dept: ADMINISTRATIVE | Facility: OTHER | Age: 65
End: 2020-09-16

## 2020-09-21 DIAGNOSIS — M54.41 ACUTE RIGHT-SIDED LOW BACK PAIN WITH RIGHT-SIDED SCIATICA: ICD-10-CM

## 2020-09-21 RX ORDER — NAPROXEN 500 MG/1
500 TABLET ORAL 2 TIMES DAILY WITH MEALS
Qty: 30 TABLET | Refills: 0 | Status: SHIPPED | OUTPATIENT
Start: 2020-09-21 | End: 2020-10-08 | Stop reason: SDUPTHER

## 2020-09-21 NOTE — TELEPHONE ENCOUNTER
Spoke with patient and let him know Sharon Loyd refilled his Naproxen, he indicated understanding and said he is trying to wean off of the Naproxen. He will call if he needs further follow-up.

## 2020-09-21 NOTE — TELEPHONE ENCOUNTER
Refill approved, but if pain is persisting, he needs to consider further treatments - MRI and consider injections .

## 2020-09-23 ENCOUNTER — PATIENT MESSAGE (OUTPATIENT)
Dept: OTHER | Facility: OTHER | Age: 65
End: 2020-09-23

## 2020-09-23 ENCOUNTER — PATIENT OUTREACH (OUTPATIENT)
Dept: OTHER | Facility: OTHER | Age: 65
End: 2020-09-23

## 2020-09-23 NOTE — LETTER
September 23, 2020     Mike Ybarra  [id number] [place] [address]       Dear Mike,    Welcome to [facility]! Our goal is to make care effective, proactive and convenient by using data you send us from home to better treat your chronic conditions.                My name is Gem Galvez, and I am your dedicated Digital Medicine clinician. As an expert in medication management, I will help ensure that the medications you are taking continue to provide the intended benefits and help you reach your goals. You can reach me directly at [phone] or by sending me a message directly through your [facility] account.      I am Felisa Holloway and I will be your health . My job is to help you identify lifestyle changes to improve your disease control. We will talk about nutrition, exercise, and other ways you may be able to adjust your current habits to better your health. Additionally, we will help ensure you are completing the tests and screenings that are necessary to help manage your conditions. You can reach me directly at [phone] or by sending me a message directly through your [facility] account.    Most importantly, YOU are at the center of this team. Together, we will work to improve your overall health and encourage you to meet your goals for a healthier lifestyle.     What we expect from YOU:  · Please take frequent home blood pressure measurements. We ask that you take at least 1 blood pressure reading per week, but more information will better help us get you know you. Be sure you rest for a few minutes before taking the reading in a quiet, comfortable place.     Be available to receive phone calls or [facility] messages, when appropriate, from your care team. Please let us know if there are any specific days or times that work best for us to reach you via phone.     Complete routine tests and screenings. Dont worry, we will help keep you on track!            What you should expect from your Digital Medicine Care Team:   We will work with you to create a personalized plan of care and provide you with encouragement and education, including regarding lifestyle changes, that could help you manage your disease states.     We will adjust your current medications, if needed, and continue to monitor your long-term progress.     We will provide you and your physician with monthly progress reports after you have been in the program for more than 30 days.     We will send you reminders through [email or web address] and text messages to help ensure you do not miss any testing deadlines to help manage your disease states.    You will be able to reach us by phone or through your [facility] account by clicking our names under Care Team on the right side of the home screen.    We look forward to working with you to help manage your health,    Sincerely,    Your Digital Medicine Team    Please visit our websites to learn more:   · Hypertension: [email or web address]      Remember, we are not available for emergencies. If you have an emergency, please contact your doctors office directly or call [facility]-call ([phone] or [phone]) or [phone].

## 2020-09-23 NOTE — PROGRESS NOTES
Digital Medicine: Health  Introduction    Introduced Mike Ybarra to Digital Medicine. Discussed health  role and recommended lifestyle modifications.    The history is provided by the patient.           Additional Enrollment Details: Patient stated that he takes his BP medication in the morning, and usually has a cup of coffee. Encouraged patient to wait at least an hour after medication and 30 minutes after caffeine or meal consumption to take readings. Patient was receptive.     He sits at a desk in his bedroom to take BP readings. The patient mentioned he uses two fingers to measure tightness of his cuff. He also has his arm elevated/rested on the desk to take readings and feet flat on the floor. The patient mentioned that he sits up to take his readings, encouraged patient to rest/relax his back into the chair when taking readings. He was receptive.     The patient mentioned that the readings he took this morning were after he had some coffee. He said that he forgot he had coffee and took the reading too soon. Encouraged patient that his readings were still pretty close to goal.     HYPERTENSION  Explained hypertension digital medicine goals including BP goal less than or equal to 130/80mmHg, improved convenience of BP management and reduced risk of heart attack, kidney failure, stroke, eye disease, dementia, and death.      Explained non-pharmacologic therapies like low salt diet and physical activity can reduce blood pressure. .      Explained that we expect patient to submit several blood pressure readings per week at random times of the day, but at least 30 minutes after taking blood pressure medications. Instructed patient not to allow anyone else to use their blood pressure monitor and phone as data submitted is directly entered into medical record. Reviewed and confirmed appropriate blood pressure monitoring technique.         Explained to the patient that the Digital Medicine team is not  available for emergencies. Advised patient call [facility] ([phone] or [phone]) or [phone] if needed.               Diet-Assessed    Patient reports eating or drinking the following: Patient stated that his daughter is a dietician, and has been helping him come up with meal plans. She even prepares some of his meals for him from time to time.       Physical Activity-Not assessed    Medication Adherence-Medication adherence was assessed.  Patient continue taking medication as prescribed.          Patient stated that he was having issues keeping up with taking his medication consistently, until about 3 weeks ago. He has since, not had any problem remembering to take his medication consistently.       Additional monitoring needed.  Continue current diet/physical activity routine.  Provided patient education.  Reviewed Device Techniques.     Addressed patient questions and patient has my contact information if needed prior to next outreach. Patient verbalizes understanding.      Explained the importance of self-monitoring and medication adherence. Encouraged the patient to communicate with their health  for lifestyle modifications to help improve or maintain a healthy lifestyle.        Explained to the patient that the Digital Medicine team is not available for emergencies. Advised patient call [facility] ([phone] or [phone]) or [phone] if needed.         There are no preventive care reminders to display for this patient.    Last 5 Patient Entered Readings                                      Current 30 Day Average: 127/85     Recent Readings 9/23/2020 9/23/2020 9/23/2020 9/23/2020 9/22/2020    SBP (mmHg) 134 133 135 142 119    DBP (mmHg) 84 82 79 89 83    Pulse 97 96 83 83 82

## 2020-09-24 ENCOUNTER — PATIENT OUTREACH (OUTPATIENT)
Dept: OTHER | Facility: OTHER | Age: 65
End: 2020-09-24

## 2020-09-24 DIAGNOSIS — I10 ESSENTIAL HYPERTENSION: Chronic | ICD-10-CM

## 2020-09-25 ENCOUNTER — PATIENT MESSAGE (OUTPATIENT)
Dept: ADMINISTRATIVE | Facility: OTHER | Age: 65
End: 2020-09-25

## 2020-09-25 RX ORDER — LISINOPRIL 20 MG/1
20 TABLET ORAL DAILY
Qty: 30 TABLET | Refills: 5
Start: 2020-09-25 | End: 2020-11-06

## 2020-09-25 NOTE — PROGRESS NOTES
Digital Medicine: Clinician Introduction    Mike Ybarra is a 65 y.o. male who is newly enrolled in the Digital Medicine Clinic.    Called patient regarding HDMP (Hypertension Digital Medicine Program)    HPI  Patients BP average is currently 125/84 mmHg.    Patient takes Lisinopril 20 mg every morning at 6 am and checks his BP later in the day. He is a teacher and works until 4 pm. His daughter is a dietician.      The history is provided by the patient.      Review of patient's allergies indicates:   -- Avocado (laurus persea)     --  Inside felt very sick  Completed Medication Reconciliation  Verified pharmacy information.    HYPERTENSION  Explained hypertension digital medicine goals including BP goal less than or equal to 130/80mmHg, improved convenience of BP management and reduced risk of heart attack, kidney failure, stroke, eye disease, dementia, and death.     Explained non-pharmacologic therapies like low salt diet and physical activity can reduce blood pressure.       Explained that we expect patient to submit several blood pressure readings per week at random times of the day, but at least 30 minutes after taking blood pressure medications. Instructed patient not to allow anyone else to use their blood pressure monitor and phone as data submitted is directly entered into medical record. Reviewed and confirmed appropriate blood pressure monitoring technique.         Reviewed signs/symptoms of hypertension (headache, changes in vision, chest pain, shortness of breath)   Reviewed signs/symptoms of hypotension (lightheaded, dizziness, weakness)     Patient's BP goal is less than or equal to 130/80. Patients BP average is 125/84 mmHg, which is above goal, per 2017 ACC/AHA Hypertension Guidelines.       Last 5 Patient Entered Readings                                      Current 30 Day Average: 125/84     Recent Readings 9/24/2020 9/24/2020 9/24/2020 9/24/2020 9/23/2020    SBP (mmHg) 114 132 136  155 126    DBP (mmHg) 80 73 76 93 86    Pulse 77 64 57 59 106                Depression Screening  Mike Ybarra screened negative on the depression screening.     Sleep Apnea Screening  Patient not previously diagnosed with JULIANA       Medication Affordability Screening  Patient did not answer the medication affordability questionnaires. Patient is currently not having problems affording medications    Medication Adherence-Medication adherence was assessed.  Patient continue taking medication as prescribed.            ASSESSMENT(S)  Patients BP average is 125/84 mmHg, which is above goal. Patient's BP goal is less than or equal to 130/80 per 2017 ACC/AHA Hypertension Guidelines.     Hypertension Plan  Additional monitoring needed.  Continue current therapy.  Provided patient education. Counseled patient to reduce sodium to < 1500 mg/day  Will call patient in a few weeks, sooner if needed. Patient knows to reach out at any time for any questions or concerns.        Addressed patient questions and patient has my contact information if needed prior to next outreach. Patient verbalizes understanding.      Explained the importance of self-monitoring and medication adherence. Encouraged the patient to communicate with their health  for lifestyle modifications to help improve or maintain a healthy lifestyle.        Sent link to [facility] webpages and my contact information via [employer] for future questions.        Explained to the patient that the Digital Medicine team is not available for emergencies. Advised patient call [facility] ([phone] or [phone]) or [phone] if needed.           There are no preventive care reminders to display for this patient.     Current Medication Regimen:  Hypertension Medications             lisinopriL (PRINIVIL,ZESTRIL) 20 MG Tab Take 1 tablet (20 mg total) by mouth once daily.        Gem Galvez, PharmD  Digital Medicine Clinical Pharmacist  [phone]

## 2020-09-30 ENCOUNTER — PATIENT MESSAGE (OUTPATIENT)
Dept: OTHER | Facility: OTHER | Age: 65
End: 2020-09-30

## 2020-10-08 DIAGNOSIS — M54.41 ACUTE RIGHT-SIDED LOW BACK PAIN WITH RIGHT-SIDED SCIATICA: ICD-10-CM

## 2020-10-08 RX ORDER — NAPROXEN 500 MG/1
500 TABLET ORAL 2 TIMES DAILY WITH MEALS
Qty: 30 TABLET | Refills: 0 | Status: SHIPPED | OUTPATIENT
Start: 2020-10-08 | End: 2021-06-22

## 2020-10-08 NOTE — TELEPHONE ENCOUNTER
Refill approved.  If he is still having pain, would recommend he consider MRI and consider injections.

## 2020-10-08 NOTE — TELEPHONE ENCOUNTER
Notified patient that Sharon Loyd refilled his Naproxen. He said he did not request that refill, but would like a refill on the Tizanidine. He is scheduled for an MRI next week.

## 2020-10-09 RX ORDER — TIZANIDINE 4 MG/1
4 TABLET ORAL NIGHTLY PRN
Qty: 40 TABLET | Refills: 0 | Status: SHIPPED | OUTPATIENT
Start: 2020-10-09 | End: 2020-11-16 | Stop reason: SDUPTHER

## 2020-10-15 ENCOUNTER — PATIENT MESSAGE (OUTPATIENT)
Dept: FAMILY MEDICINE | Facility: CLINIC | Age: 65
End: 2020-10-15

## 2020-10-15 DIAGNOSIS — B00.1 FEVER BLISTER: ICD-10-CM

## 2020-10-15 RX ORDER — VALACYCLOVIR HYDROCHLORIDE 1 G/1
1000 TABLET, FILM COATED ORAL 2 TIMES DAILY
Qty: 4 TABLET | Refills: 0 | Status: SHIPPED | OUTPATIENT
Start: 2020-10-15 | End: 2021-10-21 | Stop reason: SDUPTHER

## 2020-10-15 NOTE — TELEPHONE ENCOUNTER
Care Due:                  Date            Visit Type   Department     Provider  --------------------------------------------------------------------------------                                             [facility] FAMILY  Last Visit: 08-      None         MEDICINE       HENRIK HERNANDEZ  Next Visit: None Scheduled  None         None Found                                                            Last  Test          Frequency    Reason                     Performed    Due Date  --------------------------------------------------------------------------------    WBC.........  12 months..  valACYclovir.............  Not Found    Overdue    Powered by [employer]. Reference number: [id number]. 10/15/2020 10:24:17 AM   CDT

## 2020-11-06 ENCOUNTER — PATIENT OUTREACH (OUTPATIENT)
Dept: OTHER | Facility: OTHER | Age: 65
End: 2020-11-06

## 2020-11-06 DIAGNOSIS — I10 ESSENTIAL HYPERTENSION: Chronic | ICD-10-CM

## 2020-11-06 RX ORDER — LISINOPRIL 20 MG/1
20 TABLET ORAL DAILY
Qty: 30 TABLET | Refills: 5
Start: 2020-11-06 | End: 2021-02-01 | Stop reason: ALTCHOICE

## 2020-11-06 NOTE — PROGRESS NOTES
Digital Medicine: Clinician Follow-Up    Called patient for routine follow up regarding HDMP (Hypertension Digital Medicine Program)    HPI  Patients BP average is currently 127/85 mmHg.    Patient states he has been watching his sodium intake. He states his goal is to get off the lisinopril and have his BP controlled without medications.    The history is provided by the patient.   Follow-up reason(s): routine follow up.     Hypertension    Patient's blood pressure is stable.   Patient is not experiencing signs/symptoms of hypotension.  Patient is not experiencing signs/symptoms of hypertension.            Last 5 Patient Entered Readings                                      Current 30 Day Average: 127/85     Recent Readings 10/29/2020 10/20/2020 10/20/2020 10/20/2020 10/19/2020    SBP (mmHg) 126 125 131 133 133    DBP (mmHg) 84 85 86 86 89    Pulse 70 80 78 77 86                 Depression Screening  Did not address depression screening.    Sleep Apnea Screening    Did not address sleep apnea screening.     Medication Affordability Screening  Did not address medication affordability screening.     Medication Adherence-Medication adherence was assessed.          ASSESSMENT(S)  Patients BP average is 127/85 mmHg, which is above goal. Patient's BP goal is less than or equal to 130/80.     Hypertension Plan  Additional monitoring needed.  Continue current therapy.  Patient declined medication changes. Patient refused to change lisinopril to olmesartan at this time. Would like a few weeks to decrease sodium intake.  Provided patient education. Counseled patient on reducing sodium to < 1500 mg/day  Will call patient in a few weeks, sooner if needed. Patient knows to reach out at any time for any questions or concerns.        Addressed patient questions and patient has my contact information if needed prior to next outreach. Patient verbalizes understanding.             There are no preventive care reminders to display  for this patient.  There are no preventive care reminders to display for this patient.      Hypertension Medications             INV lisinopril (PRINIVIL,ZESTRIL) 20 MG tablet Take 1 tablet (20 mg total) by mouth once daily.        Gem Galvez, PharmD  Digital Medicine Clinician  [phone]

## 2020-11-11 ENCOUNTER — PATIENT OUTREACH (OUTPATIENT)
Dept: OTHER | Facility: OTHER | Age: 65
End: 2020-11-11

## 2020-11-11 NOTE — PROGRESS NOTES
Digital Medicine: Health  Follow-Up    The history is provided by the patient.             Reason for review: Blood pressure not at goal        Topics Covered on Call: Diet    Additional Follow-up details: Patient's BP readings have been lowering, and the patient attributes this to his increased awareness             Diet-Change    Patient reports eating or drinking the following: Patient stated that he was previously snacking on some chips, in which he realized were full of sodium. The patient stated that once he realized how much sodium was in the chips, he cut them out of his daily routine. The patient mentioned that he has been paying more attention to the food labels on his typical snacks.     Patient stated that he has been eating cheerios and eating foods that will help lower his cholesterol.       Physical Activity-Not assessed    Medication Adherence-Medication adherence was assessed.        Substance, Sleep, Stress-No change  stress-  Details:  Intervention(s):    Sleep-  Details:  Intervention(s):    Alcohol -  Details:  Intervention(s):    Tobacco-  Details:  Intervention(s):          Additional monitoring needed.  Continue current diet/physical activity routine.       Addressed patient questions and patient has my contact information if needed prior to next outreach. Patient verbalizes understanding.      Explained the importance of self-monitoring and medication adherence. Encouraged the patient to communicate with their health  for lifestyle modifications to help improve or maintain a healthy lifestyle.               There are no preventive care reminders to display for this patient.      Last 5 Patient Entered Readings                                      Current 30 Day Average: 125/85     Recent Readings 11/10/2020 11/10/2020 11/8/2020 11/8/2020 11/7/2020    SBP (mmHg) 118 123 124 131 123    DBP (mmHg) 77 82 79 86 90    Pulse 77 78 61 70 106

## 2020-11-16 DIAGNOSIS — M54.41 ACUTE RIGHT-SIDED LOW BACK PAIN WITH RIGHT-SIDED SCIATICA: ICD-10-CM

## 2020-11-16 RX ORDER — TIZANIDINE 4 MG/1
4 TABLET ORAL NIGHTLY PRN
Qty: 40 TABLET | Refills: 0 | Status: SHIPPED | OUTPATIENT
Start: 2020-11-16 | End: 2021-06-22

## 2020-11-16 NOTE — TELEPHONE ENCOUNTER
I have refilled tizanidine.  If he is still having pain, I recommend obtaining MRI to determine further options to help him.

## 2020-11-17 NOTE — TELEPHONE ENCOUNTER
Called patient to let him know Sharon Loyd refilled his Tizanidine and her recommendation for an MRI, got voicemail, left return call message.

## 2020-11-20 NOTE — TELEPHONE ENCOUNTER
Spoke with patient and he said he is on his way to [place] and he will  the Tizanidine as soon as he gets back. He is doing better and does not want to have the MRI at this time. He will call if his condition worsens.

## 2020-11-29 ENCOUNTER — CLINICAL SUPPORT (OUTPATIENT)
Dept: URGENT CARE | Facility: CLINIC | Age: 65
End: 2020-11-29
Payer: COMMERCIAL

## 2020-11-29 DIAGNOSIS — Z11.52 ENCOUNTER FOR SCREENING LABORATORY TESTING FOR COVID-19 VIRUS: Primary | ICD-10-CM

## 2020-11-29 LAB
CTP QC/QA: YES
SARS-COV-2 RDRP RESP QL NAA+PROBE: NEGATIVE

## 2020-11-29 PROCEDURE — U0002: ICD-10-PCS | Mod: QW,S$GLB,, | Performed by: FAMILY MEDICINE

## 2020-11-29 PROCEDURE — U0002 COVID-19 LAB TEST NON-CDC: HCPCS | Mod: QW,S$GLB,, | Performed by: FAMILY MEDICINE

## 2020-11-29 NOTE — PROGRESS NOTES
CDC TESTING AND QUARANTINE GUIDELINES FOR EXPOSURE   A CLOSE EXPOSURE IS DEFINED AS ANYONE WHO HAD A MASKED OR AN UNMASKED EXPOSURE TO A KNOWN  19 POSITIVE PERSON, AT LESS THAN 6 FT FOR MORE THAN 15 MINUTES. IF YOUR EXPOSURE MEETS THIS DEFINITION, THEN YOU ARE REQUIRED TO QUARANTINE FOR 14 DAYS PER THE CDC. THEY RECOMMEND THAT A TEST CAN BE PERFORMED IF YOU ASYMPTOMATIC.(SOMEONE WHO DOES NOT HAVE ANY SYMPTOMS), AND A TEST SHOULD BE DONE IF YOU DEVELOP SYMPTOMS AFTER AN EXPOSURE AS DESCRIBED ABOVE.    IF YOU MEET THE DEFINITION OF A CLOSE EXPOSURE, IT DOES NOT MATTER WHETHER OR NOT YOU ARE ASYMPTOMATIC OR SYMPTOMATIC- A NEGATIVE TEST DOES NOT GET YOU OUT OF 14 DAYS OF QUARANTINE!    PLEASE NOTE THAT IF YOU ARE ASYMPTOMATIC AND WAIT MORE THAN 4 DAYS TO TEST AFTER AN EXPOSURE, YOU RISK LENGTHENING YOUR QUARANTINE.THIS IS BECAUSE IF YOU TEST POSITIVE AS AN ASYMPTOMATIC, YOUR ISOLATION IS 10 DAYS FROM THE DATE OF THE POSITIVE TEST, NOT THE DATE OF EXPOSURE. SO FOR EXAMPLE, IF YOU TEST POSITIVE AS AN ASYMPTOMATIC ON DAY 7 FROM EXPOSURE, YOU HAVE NOW EXTENDED YOUR 14 DAY QUARANTINE TO A  17 DAY ISOLAION    IF YOUR EXPOSURE DOES NOT MEET THE ABOVE DEFINITION, YOU MAY RETURN TO YOUR NORMAL ACTIVITIES INCLUDING SOCIAL DISTANCING, WEARING MASKS, AND FREQUENT HANDWASHING

## 2020-12-04 ENCOUNTER — PATIENT OUTREACH (OUTPATIENT)
Dept: OTHER | Facility: OTHER | Age: 65
End: 2020-12-04

## 2020-12-07 ENCOUNTER — PATIENT MESSAGE (OUTPATIENT)
Dept: FAMILY MEDICINE | Facility: CLINIC | Age: 65
End: 2020-12-07

## 2020-12-07 NOTE — TELEPHONE ENCOUNTER
He actually is needing to get his cholesterol profile drawn.  He would like to do this today if possible.    If there is a slight available please schedule him today or at his convenience.  This would be for a lipid profile in the orders are in.    Tyrlel Chavarria MD

## 2020-12-11 ENCOUNTER — LAB VISIT (OUTPATIENT)
Dept: LAB | Facility: HOSPITAL | Age: 65
End: 2020-12-11
Attending: EMERGENCY MEDICINE
Payer: COMMERCIAL

## 2020-12-11 DIAGNOSIS — E78.2 MIXED HYPERLIPIDEMIA: Chronic | ICD-10-CM

## 2020-12-11 PROCEDURE — 36415 COLL VENOUS BLD VENIPUNCTURE: CPT | Mod: PO

## 2020-12-11 PROCEDURE — 80061 LIPID PANEL: CPT

## 2020-12-12 LAB
CHOLEST SERPL-MCNC: 226 MG/DL (ref 120–199)
CHOLEST/HDLC SERPL: 4.1 {RATIO} (ref 2–5)
HDLC SERPL-MCNC: 55 MG/DL (ref 40–75)
HDLC SERPL: 24.3 % (ref 20–50)
LDLC SERPL CALC-MCNC: 146.4 MG/DL (ref 63–159)
NONHDLC SERPL-MCNC: 171 MG/DL
TRIGL SERPL-MCNC: 123 MG/DL (ref 30–150)

## 2020-12-13 ENCOUNTER — PATIENT MESSAGE (OUTPATIENT)
Dept: FAMILY MEDICINE | Facility: CLINIC | Age: 65
End: 2020-12-13

## 2020-12-13 DIAGNOSIS — E78.2 MIXED HYPERLIPIDEMIA: Primary | Chronic | ICD-10-CM

## 2020-12-16 ENCOUNTER — PATIENT OUTREACH (OUTPATIENT)
Dept: OTHER | Facility: OTHER | Age: 65
End: 2020-12-16

## 2020-12-16 NOTE — PROGRESS NOTES
Digital Medicine: Health  Follow-Up    The history is provided by the patient.             Reason for review: Blood pressure not at goal        Topics Covered on Call: physical activity and Diet    Additional Follow-up details: Patient and I spoke about his recent BP readings. The patient's BP is well controlled and his readings are below/at goal. The patient had a question about some of his recent lab work. I informed the patient's clinician Gem, who will call him tomorrow. The patient was receptive. He has not changed much about his eating habits or physical activity. Encouraged the patient to keep up the good work, and that his avg BP is almost right where we'd like to see it.             Diet-no change to diet    No change to diet.  Patient reports eating or drinking the following: Patient stated that he has been eating plant based meals, that are balanced. Green beans, broccoli, or a salad. He has not been seasoning his food with salt at all. He eats Cheerios in the morning with a cup of coffee and then fasts until supper. For a snack, he eats protein pancakes with sugar free syrup.       Physical Activity-no change to routine  No change to exercise routine.   20 day(s) a week.     Additional physical activity details: Patient stated that he walks on the treadmill for about 20 minutes each day.       Medication Adherence-Medication adherence was assessed.      Substance, Sleep, Stress-Not assessed      Additional monitoring needed.  Continue current diet/physical activity routine.       Addressed patient questions and patient has my contact information if needed prior to next outreach. Patient verbalizes understanding.      Explained the importance of self-monitoring and medication adherence. Encouraged the patient to communicate with their health  for lifestyle modifications to help improve or maintain a healthy lifestyle.               There are no preventive care reminders to display for this  patient.      Last 5 Patient Entered Readings                                      Current 30 Day Average: 123/82     Recent Readings 12/14/2020 12/14/2020 12/9/2020 12/9/2020 12/9/2020    SBP (mmHg) 123 128 114 117 129    DBP (mmHg) 79 85 77 84 86    Pulse 77 81 82 89 89

## 2020-12-18 NOTE — PROGRESS NOTES
Digital Medicine: Clinician Follow-Up    Called patient for routine follow up regarding HDMP (Hypertension Digital Medicine Program)    HPI  Patients BP average is currently 123/83 mmHg.    Patient states his BP is trending down because he has been watching his sodium intake by reading nutrition labels. He has limited his trips to [place] and has not been adding additional sodium to anything.     Patient inquired about his lab results from last week.    The history is provided by the patient.   Follow-up reason(s): routine follow up.     Hypertension    Readings are trending down due to medication adherence.       Patient is not experiencing signs/symptoms of hypotension.  Patient is not experiencing signs/symptoms of hypertension.            Last 5 Patient Entered Readings                                      Current 30 Day Average: 123/83     Recent Readings 12/18/2020 12/18/2020 12/14/2020 12/14/2020 12/9/2020    SBP (mmHg) 128 144 123 128 114    DBP (mmHg) 78 85 79 85 77    Pulse 77 74 77 81 82                 Depression Screening  Did not address depression screening.    Sleep Apnea Screening    Did not address sleep apnea screening.     Medication Affordability Screening  Did not address medication affordability screening.     Medication Adherence-Medication adherence was assessed.          ASSESSMENT(S)  Patients BP average is 123/83 mmHg, which is above goal. Patient's BP goal is less than or equal to 130/80.     Hypertension Plan  Continue current therapy.  Continue current diet/physical activity routine.  Provided patient education. Informed patient that his cholesterol has decreased but still above goal. Encouraged him to discuss with pcp any further questions regarding his lipid panel.   Will call patient in a few weeks, sooner if needed. When patient runs out of losartan doses in a month, will switch to olmesartan 20 mg. Patient knows to reach out at any time for any questions or concerns.         Addressed patient questions and patient has my contact information if needed prior to next outreach. Patient verbalizes understanding.             There are no preventive care reminders to display for this patient.  There are no preventive care reminders to display for this patient.      Hypertension Medications             lisinopriL (PRINIVIL,ZESTRIL) 20 MG tablet Take 1 tablet (20 mg total) by mouth once daily.        Gem Galvez, PharmD  Digital Medicine Clinician  [phone]

## 2021-01-04 ENCOUNTER — TELEPHONE (OUTPATIENT)
Dept: SPINE | Facility: CLINIC | Age: 66
End: 2021-01-04

## 2021-03-08 ENCOUNTER — PATIENT MESSAGE (OUTPATIENT)
Dept: FAMILY MEDICINE | Facility: CLINIC | Age: 66
End: 2021-03-08

## 2021-03-12 ENCOUNTER — PATIENT MESSAGE (OUTPATIENT)
Dept: FAMILY MEDICINE | Facility: CLINIC | Age: 66
End: 2021-03-12

## 2021-03-12 DIAGNOSIS — S33.5XXS LUMBAR SPRAIN, SEQUELA: Primary | ICD-10-CM

## 2021-03-17 ENCOUNTER — PATIENT MESSAGE (OUTPATIENT)
Dept: FAMILY MEDICINE | Facility: CLINIC | Age: 66
End: 2021-03-17

## 2021-03-18 ENCOUNTER — IMMUNIZATION (OUTPATIENT)
Dept: FAMILY MEDICINE | Facility: CLINIC | Age: 66
End: 2021-03-18
Payer: COMMERCIAL

## 2021-03-18 DIAGNOSIS — Z23 NEED FOR VACCINATION: Primary | ICD-10-CM

## 2021-03-18 PROCEDURE — 91300 COVID-19, MRNA, LNP-S, PF, 30 MCG/0.3 ML DOSE VACCINE: CPT | Mod: PBBFAC | Performed by: FAMILY MEDICINE

## 2021-04-08 ENCOUNTER — IMMUNIZATION (OUTPATIENT)
Dept: FAMILY MEDICINE | Facility: CLINIC | Age: 66
End: 2021-04-08
Payer: COMMERCIAL

## 2021-04-08 DIAGNOSIS — Z23 NEED FOR VACCINATION: Primary | ICD-10-CM

## 2021-04-08 PROCEDURE — 0002A COVID-19, MRNA, LNP-S, PF, 30 MCG/0.3 ML DOSE VACCINE: CPT | Mod: PBBFAC | Performed by: FAMILY MEDICINE

## 2021-04-08 PROCEDURE — 91300 COVID-19, MRNA, LNP-S, PF, 30 MCG/0.3 ML DOSE VACCINE: CPT | Mod: PBBFAC | Performed by: FAMILY MEDICINE

## 2021-05-04 ENCOUNTER — PATIENT MESSAGE (OUTPATIENT)
Dept: FAMILY MEDICINE | Facility: CLINIC | Age: 66
End: 2021-05-04

## 2021-06-17 ENCOUNTER — PATIENT MESSAGE (OUTPATIENT)
Dept: FAMILY MEDICINE | Facility: CLINIC | Age: 66
End: 2021-06-17

## 2021-06-19 ENCOUNTER — PATIENT MESSAGE (OUTPATIENT)
Dept: FAMILY MEDICINE | Facility: CLINIC | Age: 66
End: 2021-06-19

## 2021-06-19 DIAGNOSIS — I10 ESSENTIAL HYPERTENSION: Primary | ICD-10-CM

## 2021-06-19 DIAGNOSIS — Z80.42 FAMILY HISTORY OF PROSTATE CANCER: ICD-10-CM

## 2021-06-19 DIAGNOSIS — Z00.00 PREVENTATIVE HEALTH CARE: ICD-10-CM

## 2021-06-21 ENCOUNTER — PATIENT MESSAGE (OUTPATIENT)
Dept: FAMILY MEDICINE | Facility: CLINIC | Age: 66
End: 2021-06-21

## 2021-06-21 ENCOUNTER — LAB VISIT (OUTPATIENT)
Dept: LAB | Facility: HOSPITAL | Age: 66
End: 2021-06-21
Attending: EMERGENCY MEDICINE
Payer: COMMERCIAL

## 2021-06-21 DIAGNOSIS — I10 ESSENTIAL HYPERTENSION: ICD-10-CM

## 2021-06-21 LAB
BILIRUB UR QL STRIP: NEGATIVE
CLARITY UR: CLEAR
COLOR UR: YELLOW
GLUCOSE UR QL STRIP: NEGATIVE
HGB UR QL STRIP: NEGATIVE
KETONES UR QL STRIP: NEGATIVE
LEUKOCYTE ESTERASE UR QL STRIP: NEGATIVE
NITRITE UR QL STRIP: NEGATIVE
PH UR STRIP: 6 [PH] (ref 5–8)
PROT UR QL STRIP: NEGATIVE
SP GR UR STRIP: >=1.03 (ref 1–1.03)
URN SPEC COLLECT METH UR: ABNORMAL

## 2021-06-21 PROCEDURE — 81003 URINALYSIS AUTO W/O SCOPE: CPT | Mod: PO | Performed by: EMERGENCY MEDICINE

## 2021-06-22 ENCOUNTER — OFFICE VISIT (OUTPATIENT)
Dept: FAMILY MEDICINE | Facility: CLINIC | Age: 66
End: 2021-06-22
Payer: COMMERCIAL

## 2021-06-22 VITALS
BODY MASS INDEX: 29.09 KG/M2 | HEART RATE: 80 BPM | HEIGHT: 75 IN | SYSTOLIC BLOOD PRESSURE: 134 MMHG | DIASTOLIC BLOOD PRESSURE: 84 MMHG | OXYGEN SATURATION: 97 % | WEIGHT: 233.94 LBS

## 2021-06-22 DIAGNOSIS — M54.50 CHRONIC MIDLINE LOW BACK PAIN WITHOUT SCIATICA: ICD-10-CM

## 2021-06-22 DIAGNOSIS — E78.2 MIXED HYPERLIPIDEMIA: Chronic | ICD-10-CM

## 2021-06-22 DIAGNOSIS — Z23 VACCINE FOR STREPTOCOCCUS PNEUMONIAE AND INFLUENZA: ICD-10-CM

## 2021-06-22 DIAGNOSIS — Z80.42 FAMILY HISTORY OF PROSTATE CANCER: Chronic | ICD-10-CM

## 2021-06-22 DIAGNOSIS — Z13.6 ENCOUNTER FOR ABDOMINAL AORTIC ANEURYSM (AAA) SCREENING: ICD-10-CM

## 2021-06-22 DIAGNOSIS — I10 ESSENTIAL HYPERTENSION: Chronic | ICD-10-CM

## 2021-06-22 DIAGNOSIS — Z00.00 PREVENTATIVE HEALTH CARE: Primary | ICD-10-CM

## 2021-06-22 DIAGNOSIS — G89.29 CHRONIC MIDLINE LOW BACK PAIN WITHOUT SCIATICA: ICD-10-CM

## 2021-06-22 PROCEDURE — 1126F PR PAIN SEVERITY QUANTIFIED, NO PAIN PRESENT: ICD-10-PCS | Mod: S$GLB,,, | Performed by: EMERGENCY MEDICINE

## 2021-06-22 PROCEDURE — 1101F PR PT FALLS ASSESS DOC 0-1 FALLS W/OUT INJ PAST YR: ICD-10-PCS | Mod: CPTII,S$GLB,, | Performed by: EMERGENCY MEDICINE

## 2021-06-22 PROCEDURE — 90471 PNEUMOCOCCAL POLYSACCHARIDE VACCINE 23-VALENT =>2YO SQ IM: ICD-10-PCS | Mod: S$GLB,,, | Performed by: EMERGENCY MEDICINE

## 2021-06-22 PROCEDURE — 90732 PPSV23 VACC 2 YRS+ SUBQ/IM: CPT | Mod: S$GLB,,, | Performed by: EMERGENCY MEDICINE

## 2021-06-22 PROCEDURE — 1126F AMNT PAIN NOTED NONE PRSNT: CPT | Mod: S$GLB,,, | Performed by: EMERGENCY MEDICINE

## 2021-06-22 PROCEDURE — 1101F PT FALLS ASSESS-DOCD LE1/YR: CPT | Mod: CPTII,S$GLB,, | Performed by: EMERGENCY MEDICINE

## 2021-06-22 PROCEDURE — 3008F PR BODY MASS INDEX (BMI) DOCUMENTED: ICD-10-PCS | Mod: CPTII,S$GLB,, | Performed by: EMERGENCY MEDICINE

## 2021-06-22 PROCEDURE — 3288F FALL RISK ASSESSMENT DOCD: CPT | Mod: CPTII,S$GLB,, | Performed by: EMERGENCY MEDICINE

## 2021-06-22 PROCEDURE — 90471 IMMUNIZATION ADMIN: CPT | Mod: S$GLB,,, | Performed by: EMERGENCY MEDICINE

## 2021-06-22 PROCEDURE — 90732 PNEUMOCOCCAL POLYSACCHARIDE VACCINE 23-VALENT =>2YO SQ IM: ICD-10-PCS | Mod: S$GLB,,, | Performed by: EMERGENCY MEDICINE

## 2021-06-22 PROCEDURE — 99999 PR PBB SHADOW E&M-EST. PATIENT-LVL III: ICD-10-PCS | Mod: PBBFAC,,, | Performed by: EMERGENCY MEDICINE

## 2021-06-22 PROCEDURE — 99397 PER PM REEVAL EST PAT 65+ YR: CPT | Mod: 25,S$GLB,, | Performed by: EMERGENCY MEDICINE

## 2021-06-22 PROCEDURE — 3288F PR FALLS RISK ASSESSMENT DOCUMENTED: ICD-10-PCS | Mod: CPTII,S$GLB,, | Performed by: EMERGENCY MEDICINE

## 2021-06-22 PROCEDURE — 99999 PR PBB SHADOW E&M-EST. PATIENT-LVL III: CPT | Mod: PBBFAC,,, | Performed by: EMERGENCY MEDICINE

## 2021-06-22 PROCEDURE — 3008F BODY MASS INDEX DOCD: CPT | Mod: CPTII,S$GLB,, | Performed by: EMERGENCY MEDICINE

## 2021-06-22 PROCEDURE — 99397 PR PREVENTIVE VISIT,EST,65 & OVER: ICD-10-PCS | Mod: 25,S$GLB,, | Performed by: EMERGENCY MEDICINE

## 2021-06-22 RX ORDER — ROSUVASTATIN CALCIUM 5 MG/1
5 TABLET, COATED ORAL DAILY
Qty: 30 TABLET | Refills: 2 | Status: SHIPPED | OUTPATIENT
Start: 2021-06-22 | End: 2021-10-01

## 2021-06-29 ENCOUNTER — HOSPITAL ENCOUNTER (OUTPATIENT)
Dept: RADIOLOGY | Facility: HOSPITAL | Age: 66
Discharge: HOME | End: 2021-06-29
Attending: EMERGENCY MEDICINE
Payer: COMMERCIAL

## 2021-06-29 PROCEDURE — 76775 US ABDOMINAL AORTA: ICD-10-PCS | Mod: 26,,, | Performed by: RADIOLOGY

## 2021-06-29 PROCEDURE — 76775 US EXAM ABDO BACK WALL LIM: CPT | Mod: 26,,, | Performed by: RADIOLOGY

## 2021-06-29 PROCEDURE — 76775 US EXAM ABDO BACK WALL LIM: CPT | Mod: TC,PO

## 2021-06-30 ENCOUNTER — PATIENT MESSAGE (OUTPATIENT)
Dept: FAMILY MEDICINE | Facility: CLINIC | Age: 66
End: 2021-06-30

## 2021-07-09 ENCOUNTER — PATIENT MESSAGE (OUTPATIENT)
Dept: FAMILY MEDICINE | Facility: CLINIC | Age: 66
End: 2021-07-09

## 2021-09-22 ENCOUNTER — LAB VISIT (OUTPATIENT)
Dept: LAB | Facility: HOSPITAL | Age: 66
End: 2021-09-22
Attending: EMERGENCY MEDICINE
Payer: COMMERCIAL

## 2021-09-22 DIAGNOSIS — E78.2 MIXED HYPERLIPIDEMIA: Chronic | ICD-10-CM

## 2021-09-22 LAB
ALT SERPL W/O P-5'-P-CCNC: 24 U/L (ref 10–44)
CHOLEST SERPL-MCNC: 155 MG/DL (ref 120–199)
CHOLEST/HDLC SERPL: 2.9 {RATIO} (ref 2–5)
HDLC SERPL-MCNC: 53 MG/DL (ref 40–75)
HDLC SERPL: 34.2 % (ref 20–50)
LDLC SERPL CALC-MCNC: 80.6 MG/DL (ref 63–159)
NONHDLC SERPL-MCNC: 102 MG/DL
TRIGL SERPL-MCNC: 107 MG/DL (ref 30–150)

## 2021-09-22 PROCEDURE — 84460 ALANINE AMINO (ALT) (SGPT): CPT | Performed by: EMERGENCY MEDICINE

## 2021-09-22 PROCEDURE — 36415 COLL VENOUS BLD VENIPUNCTURE: CPT | Mod: PO | Performed by: EMERGENCY MEDICINE

## 2021-09-22 PROCEDURE — 80061 LIPID PANEL: CPT | Performed by: EMERGENCY MEDICINE

## 2021-10-01 DIAGNOSIS — E78.2 MIXED HYPERLIPIDEMIA: Chronic | ICD-10-CM

## 2021-10-01 RX ORDER — ROSUVASTATIN CALCIUM 5 MG/1
TABLET, COATED ORAL
Qty: 90 TABLET | Refills: 2 | Status: SHIPPED | OUTPATIENT
Start: 2021-10-01 | End: 2022-07-04

## 2021-10-04 ENCOUNTER — PATIENT MESSAGE (OUTPATIENT)
Dept: FAMILY MEDICINE | Facility: CLINIC | Age: 66
End: 2021-10-04

## 2021-10-04 DIAGNOSIS — I10 ESSENTIAL HYPERTENSION: ICD-10-CM

## 2021-10-05 RX ORDER — OLMESARTAN MEDOXOMIL 20 MG/1
20 TABLET ORAL DAILY
Qty: 90 TABLET | Refills: 1 | Status: SHIPPED | OUTPATIENT
Start: 2021-10-05 | End: 2022-04-05 | Stop reason: SDUPTHER

## 2021-10-07 ENCOUNTER — PATIENT MESSAGE (OUTPATIENT)
Dept: FAMILY MEDICINE | Facility: CLINIC | Age: 66
End: 2021-10-07

## 2021-10-07 DIAGNOSIS — F41.1 PRE-OPERATIVE ANXIETY: Primary | ICD-10-CM

## 2021-10-07 RX ORDER — DIAZEPAM 5 MG/1
TABLET ORAL
Qty: 2 TABLET | Refills: 0 | Status: SHIPPED | OUTPATIENT
Start: 2021-10-07 | End: 2022-06-02

## 2021-10-21 ENCOUNTER — PATIENT MESSAGE (OUTPATIENT)
Dept: FAMILY MEDICINE | Facility: CLINIC | Age: 66
End: 2021-10-21
Payer: COMMERCIAL

## 2021-10-21 DIAGNOSIS — B00.1 FEVER BLISTER: ICD-10-CM

## 2021-10-21 RX ORDER — VALACYCLOVIR HYDROCHLORIDE 1 G/1
1000 TABLET, FILM COATED ORAL 2 TIMES DAILY
Qty: 180 TABLET | Refills: 2 | Status: SHIPPED | OUTPATIENT
Start: 2021-10-21 | End: 2022-06-02

## 2021-11-08 ENCOUNTER — PATIENT MESSAGE (OUTPATIENT)
Dept: FAMILY MEDICINE | Facility: CLINIC | Age: 66
End: 2021-11-08
Payer: COMMERCIAL

## 2021-11-08 DIAGNOSIS — E78.2 MIXED HYPERLIPIDEMIA: Primary | ICD-10-CM

## 2021-11-09 ENCOUNTER — PATIENT MESSAGE (OUTPATIENT)
Dept: FAMILY MEDICINE | Facility: CLINIC | Age: 66
End: 2021-11-09
Payer: COMMERCIAL

## 2022-02-25 ENCOUNTER — TELEPHONE (OUTPATIENT)
Dept: FAMILY MEDICINE | Facility: CLINIC | Age: 67
End: 2022-02-25
Payer: COMMERCIAL

## 2022-02-25 ENCOUNTER — PATIENT MESSAGE (OUTPATIENT)
Dept: FAMILY MEDICINE | Facility: CLINIC | Age: 67
End: 2022-02-25
Payer: COMMERCIAL

## 2022-03-14 ENCOUNTER — OFFICE VISIT (OUTPATIENT)
Dept: FAMILY MEDICINE | Facility: CLINIC | Age: 67
End: 2022-03-14
Payer: COMMERCIAL

## 2022-03-14 VITALS
BODY MASS INDEX: 30.56 KG/M2 | OXYGEN SATURATION: 96 % | SYSTOLIC BLOOD PRESSURE: 134 MMHG | WEIGHT: 245.81 LBS | HEIGHT: 75 IN | RESPIRATION RATE: 14 BRPM | HEART RATE: 64 BPM | DIASTOLIC BLOOD PRESSURE: 80 MMHG

## 2022-03-14 DIAGNOSIS — Z80.42 FAMILY HISTORY OF PROSTATE CANCER: Chronic | ICD-10-CM

## 2022-03-14 DIAGNOSIS — J30.1 SEASONAL ALLERGIC RHINITIS DUE TO POLLEN: ICD-10-CM

## 2022-03-14 DIAGNOSIS — E78.2 MIXED HYPERLIPIDEMIA: Chronic | ICD-10-CM

## 2022-03-14 DIAGNOSIS — I10 ESSENTIAL HYPERTENSION: Primary | Chronic | ICD-10-CM

## 2022-03-14 PROCEDURE — 3008F BODY MASS INDEX DOCD: CPT | Mod: CPTII,S$GLB,, | Performed by: EMERGENCY MEDICINE

## 2022-03-14 PROCEDURE — 99213 PR OFFICE/OUTPT VISIT, EST, LEVL III, 20-29 MIN: ICD-10-PCS | Mod: S$GLB,,, | Performed by: EMERGENCY MEDICINE

## 2022-03-14 PROCEDURE — 3075F SYST BP GE 130 - 139MM HG: CPT | Mod: CPTII,S$GLB,, | Performed by: EMERGENCY MEDICINE

## 2022-03-14 PROCEDURE — 1126F PR PAIN SEVERITY QUANTIFIED, NO PAIN PRESENT: ICD-10-PCS | Mod: CPTII,S$GLB,, | Performed by: EMERGENCY MEDICINE

## 2022-03-14 PROCEDURE — 3008F PR BODY MASS INDEX (BMI) DOCUMENTED: ICD-10-PCS | Mod: CPTII,S$GLB,, | Performed by: EMERGENCY MEDICINE

## 2022-03-14 PROCEDURE — 1159F PR MEDICATION LIST DOCUMENTED IN MEDICAL RECORD: ICD-10-PCS | Mod: CPTII,S$GLB,, | Performed by: EMERGENCY MEDICINE

## 2022-03-14 PROCEDURE — 99999 PR PBB SHADOW E&M-EST. PATIENT-LVL IV: CPT | Mod: PBBFAC,,, | Performed by: EMERGENCY MEDICINE

## 2022-03-14 PROCEDURE — 4010F ACE/ARB THERAPY RXD/TAKEN: CPT | Mod: CPTII,S$GLB,, | Performed by: EMERGENCY MEDICINE

## 2022-03-14 PROCEDURE — 3288F FALL RISK ASSESSMENT DOCD: CPT | Mod: CPTII,S$GLB,, | Performed by: EMERGENCY MEDICINE

## 2022-03-14 PROCEDURE — 1101F PT FALLS ASSESS-DOCD LE1/YR: CPT | Mod: CPTII,S$GLB,, | Performed by: EMERGENCY MEDICINE

## 2022-03-14 PROCEDURE — 1101F PR PT FALLS ASSESS DOC 0-1 FALLS W/OUT INJ PAST YR: ICD-10-PCS | Mod: CPTII,S$GLB,, | Performed by: EMERGENCY MEDICINE

## 2022-03-14 PROCEDURE — 99999 PR PBB SHADOW E&M-EST. PATIENT-LVL IV: ICD-10-PCS | Mod: PBBFAC,,, | Performed by: EMERGENCY MEDICINE

## 2022-03-14 PROCEDURE — 1159F MED LIST DOCD IN RCRD: CPT | Mod: CPTII,S$GLB,, | Performed by: EMERGENCY MEDICINE

## 2022-03-14 PROCEDURE — 99213 OFFICE O/P EST LOW 20 MIN: CPT | Mod: S$GLB,,, | Performed by: EMERGENCY MEDICINE

## 2022-03-14 PROCEDURE — 1126F AMNT PAIN NOTED NONE PRSNT: CPT | Mod: CPTII,S$GLB,, | Performed by: EMERGENCY MEDICINE

## 2022-03-14 PROCEDURE — 3079F PR MOST RECENT DIASTOLIC BLOOD PRESSURE 80-89 MM HG: ICD-10-PCS | Mod: CPTII,S$GLB,, | Performed by: EMERGENCY MEDICINE

## 2022-03-14 PROCEDURE — 3079F DIAST BP 80-89 MM HG: CPT | Mod: CPTII,S$GLB,, | Performed by: EMERGENCY MEDICINE

## 2022-03-14 PROCEDURE — 3288F PR FALLS RISK ASSESSMENT DOCUMENTED: ICD-10-PCS | Mod: CPTII,S$GLB,, | Performed by: EMERGENCY MEDICINE

## 2022-03-14 PROCEDURE — 3075F PR MOST RECENT SYSTOLIC BLOOD PRESS GE 130-139MM HG: ICD-10-PCS | Mod: CPTII,S$GLB,, | Performed by: EMERGENCY MEDICINE

## 2022-03-14 PROCEDURE — 4010F PR ACE/ARB THEARPY RXD/TAKEN: ICD-10-PCS | Mod: CPTII,S$GLB,, | Performed by: EMERGENCY MEDICINE

## 2022-03-14 RX ORDER — FLUTICASONE PROPIONATE 50 MCG
1 SPRAY, SUSPENSION (ML) NASAL DAILY
Qty: 16 G | Refills: 5 | Status: SHIPPED | OUTPATIENT
Start: 2022-03-14 | End: 2022-09-08

## 2022-03-14 RX ORDER — PREDNISONE 20 MG/1
20 TABLET ORAL DAILY
Qty: 5 TABLET | Refills: 0 | Status: SHIPPED | OUTPATIENT
Start: 2022-03-14 | End: 2022-06-02

## 2022-03-14 NOTE — PROGRESS NOTES
Subjective:   THIS NOTE IS DONE WITH VOICE RECOGNITION        Patient ID: Mike Ybarra is a 66 y.o. male.    Chief Complaint: Follow-up (CPAP questions )         Assessment:       1. Essential hypertension    2. Mixed hyperlipidemia    3. Family history of prostate cancer    4. Seasonal allergic rhinitis due to pollen        Plan:       1. Essential hypertension  Controlled.  Continue current medications  Continue to avoid excessive sodium  Continue home monitoring  Target blood pressure is 139/89 or less    2. Mixed hyperlipidemia  Lipids controlled  Continue current medications  Annual lipid profile and comprehensive metabolic panel  If complications developed such as myalgia or arthralgia, contact me.    3. Family history of prostate cancer  Continue to monitor with annual PSA  No new symptoms    4. Seasonal allergic rhinitis due to pollen  More aggressive intervention for seasonal allergies recommended  Burst of prednisone  Aggressive use of Flonase  If not improved in a week, he will let me know.    - predniSONE (DELTASONE) 20 MG tablet; Take 1 tablet (20 mg total) by mouth once daily.  Dispense: 5 tablet; Refill: 0  - fluticasone propionate (FLONASE) 50 mcg/actuation nasal spray; 1 spray (50 mcg total) by Each Nostril route once daily.  Dispense: 16 g; Refill: 5        HPI     Here to follow up on blood pressure.  Taking current medications.  His pressure has been well controlled.  There is not any significant chest pain or rhythm disturbance.    BP Readings from Last 3 Encounters:   03/14/22 134/80   06/22/21 134/84   09/08/20 (!) 145/89     He has been having significant issues with nasal discharge and your congestion.  This is the allergy time of year for him.  He has been using Zyrtec D frequently, with no significant change in blood pressure, but does help his allergies.  He would like more control of his allergies.    I have reminded him that the decongestants Zyrtec D will increase his blood  pressure of ask him to use plain Zyrtec.    Secondary to his allergy symptoms, his sleep has been disrupted.  This is been a problem for the last month.    Hyperlipidemia monitoring.  Using Crestor in the AM.  Excellent results.    Lab Results   Component Value Date    CHOL 155 09/22/2021    CHOL 230 (H) 06/21/2021    CHOL 226 (H) 12/11/2020     Lab Results   Component Value Date    HDL 53 09/22/2021    HDL 52 06/21/2021    HDL 55 12/11/2020     Lab Results   Component Value Date    LDLCALC 80.6 09/22/2021    LDLCALC 135.2 06/21/2021    LDLCALC 146.4 12/11/2020     Lab Results   Component Value Date    TRIG 107 09/22/2021    TRIG 214 (H) 06/21/2021    TRIG 123 12/11/2020     Lab Results   Component Value Date    CHOLHDL 34.2 09/22/2021    CHOLHDL 22.6 06/21/2021    CHOLHDL 24.3 12/11/2020         Current Outpatient Medications   Medication Sig Dispense Refill    olmesartan (BENICAR) 20 MG tablet Take 1 tablet (20 mg total) by mouth once daily. 90 tablet 1    rosuvastatin (CRESTOR) 5 MG tablet TAKE 1 TABLET(5 MG) BY MOUTH EVERY DAY 90 tablet 2    tadalafil (CIALIS) 20 MG Tab Take 1 tablet (20 mg total) by mouth once daily. 6 tablet 2    valACYclovir (VALTREX) 1000 MG tablet Take 1 tablet (1,000 mg total) by mouth 2 (two) times daily. 180 tablet 2    diazePAM (VALIUM) 5 MG tablet One tablet by mouth 30 minutes prior to procedure.  Do not drive while using this medications. (Patient not taking: Reported on 3/14/2022) 2 tablet 0     No current facility-administered medications for this visit.         Review of Systems   Constitutional: Negative for chills and fever.   HENT: Positive for congestion, postnasal drip, rhinorrhea and sinus pain. Negative for hearing loss.    Respiratory: Positive for cough (less now). Negative for wheezing.    Cardiovascular: Negative for chest pain.   Gastrointestinal: Negative.    Genitourinary: Negative.    Musculoskeletal: Negative.    Skin: Negative for rash.       Objective:       Physical Exam  Vitals reviewed.   Constitutional:       Appearance: Normal appearance. He is normal weight. He is not ill-appearing.   HENT:      Head: Normocephalic.      Right Ear: Tympanic membrane, ear canal and external ear normal.      Left Ear: Tympanic membrane, ear canal and external ear normal.      Nose: Congestion and rhinorrhea present.      Mouth/Throat:      Mouth: Mucous membranes are moist.      Pharynx: Oropharynx is clear.   Eyes:      Conjunctiva/sclera: Conjunctivae normal.   Cardiovascular:      Rate and Rhythm: Normal rate and regular rhythm.      Pulses: Normal pulses.      Heart sounds: No murmur heard.  Pulmonary:      Effort: Pulmonary effort is normal.      Breath sounds: No wheezing.   Musculoskeletal:      Cervical back: Neck supple.   Lymphadenopathy:      Cervical: No cervical adenopathy.   Skin:     General: Skin is warm and dry.      Findings: No rash.   Neurological:      Mental Status: He is alert.

## 2022-03-17 ENCOUNTER — PATIENT MESSAGE (OUTPATIENT)
Dept: FAMILY MEDICINE | Facility: CLINIC | Age: 67
End: 2022-03-17
Payer: COMMERCIAL

## 2022-04-05 DIAGNOSIS — I10 ESSENTIAL HYPERTENSION: ICD-10-CM

## 2022-04-05 RX ORDER — OLMESARTAN MEDOXOMIL 20 MG/1
20 TABLET ORAL DAILY
Qty: 90 TABLET | Refills: 0 | Status: SHIPPED | OUTPATIENT
Start: 2022-04-05 | End: 2022-07-04

## 2022-04-05 NOTE — TELEPHONE ENCOUNTER
Refill Authorization Note   Mike Ybarra  is requesting a refill authorization.  Brief Assessment and Rationale for Refill:  Approve    -Medication-Related Problems Identified: Requires labs  Medication Therapy Plan:       Medication Reconciliation Completed: No   Comments:     Provider Staff:     Action is required for this patient.   Please see care gap opportunities below in Care Due Message.     Thanks!  [facility]     Appointments      Date Provider   Last Visit   3/14/2022 Tyrell Chavarria Jr., MD   Next Visit   Visit date not found Tyrell Chavarria Jr., MD     Note composed:1:47 PM 04/05/2022           Note composed:1:47 PM 04/05/2022

## 2022-04-05 NOTE — TELEPHONE ENCOUNTER
Care Due:                  Date            Visit Type   Department     Provider  --------------------------------------------------------------------------------                                EP -                              PRIMARY      [facility] FAMILY  Last Visit: 03-      CARE ([facility])   MEDICINE       CLIFF CACERES  Next Visit: None Scheduled  None         None Found                                                            Last  Test          Frequency    Reason                     Performed    Due Date  --------------------------------------------------------------------------------    CBC.........  12 months..  valACYclovir.............  06- 06-    CMP.........  12 months..  olmesartan, rosuvastatin,   06- 06-                             valACYclovir.............    Powered by [employer]. Reference number: [id number].   4/05/2022 1:17:57 PM CDT

## 2022-05-03 ENCOUNTER — PATIENT MESSAGE (OUTPATIENT)
Dept: FAMILY MEDICINE | Facility: CLINIC | Age: 67
End: 2022-05-03
Payer: COMMERCIAL

## 2022-05-09 ENCOUNTER — PATIENT MESSAGE (OUTPATIENT)
Dept: SMOKING CESSATION | Facility: CLINIC | Age: 67
End: 2022-05-09
Payer: COMMERCIAL

## 2022-05-25 ENCOUNTER — PATIENT MESSAGE (OUTPATIENT)
Dept: SPINE | Facility: CLINIC | Age: 67
End: 2022-05-25
Payer: COMMERCIAL

## 2022-05-25 ENCOUNTER — PATIENT MESSAGE (OUTPATIENT)
Dept: FAMILY MEDICINE | Facility: CLINIC | Age: 67
End: 2022-05-25
Payer: COMMERCIAL

## 2022-06-02 ENCOUNTER — OFFICE VISIT (OUTPATIENT)
Dept: NEUROLOGY | Facility: CLINIC | Age: 67
End: 2022-06-02
Payer: COMMERCIAL

## 2022-06-02 VITALS — BODY MASS INDEX: 30.45 KG/M2 | HEIGHT: 75 IN | WEIGHT: 244.94 LBS

## 2022-06-02 DIAGNOSIS — R51.0 POSITIONAL HEADACHE: Primary | ICD-10-CM

## 2022-06-02 DIAGNOSIS — I10 ESSENTIAL HYPERTENSION: Chronic | ICD-10-CM

## 2022-06-02 DIAGNOSIS — G47.9 SLEEP DISORDER: ICD-10-CM

## 2022-06-02 DIAGNOSIS — R06.83 SNORING: ICD-10-CM

## 2022-06-02 DIAGNOSIS — M79.18 CERVICAL MYOFASCIAL PAIN SYNDROME: ICD-10-CM

## 2022-06-02 DIAGNOSIS — F45.8 BRUXISM: ICD-10-CM

## 2022-06-02 DIAGNOSIS — G43.019 INTRACTABLE MIGRAINE WITHOUT AURA AND WITHOUT STATUS MIGRAINOSUS: ICD-10-CM

## 2022-06-02 PROCEDURE — 4010F PR ACE/ARB THEARPY RXD/TAKEN: ICD-10-PCS | Mod: CPTII,S$GLB,, | Performed by: NURSE PRACTITIONER

## 2022-06-02 PROCEDURE — 1126F AMNT PAIN NOTED NONE PRSNT: CPT | Mod: CPTII,S$GLB,, | Performed by: NURSE PRACTITIONER

## 2022-06-02 PROCEDURE — 99205 OFFICE O/P NEW HI 60 MIN: CPT | Mod: S$GLB,,, | Performed by: NURSE PRACTITIONER

## 2022-06-02 PROCEDURE — 1126F PR PAIN SEVERITY QUANTIFIED, NO PAIN PRESENT: ICD-10-PCS | Mod: CPTII,S$GLB,, | Performed by: NURSE PRACTITIONER

## 2022-06-02 PROCEDURE — 1101F PT FALLS ASSESS-DOCD LE1/YR: CPT | Mod: CPTII,S$GLB,, | Performed by: NURSE PRACTITIONER

## 2022-06-02 PROCEDURE — 3288F PR FALLS RISK ASSESSMENT DOCUMENTED: ICD-10-PCS | Mod: CPTII,S$GLB,, | Performed by: NURSE PRACTITIONER

## 2022-06-02 PROCEDURE — 1159F PR MEDICATION LIST DOCUMENTED IN MEDICAL RECORD: ICD-10-PCS | Mod: CPTII,S$GLB,, | Performed by: NURSE PRACTITIONER

## 2022-06-02 PROCEDURE — 3008F PR BODY MASS INDEX (BMI) DOCUMENTED: ICD-10-PCS | Mod: CPTII,S$GLB,, | Performed by: NURSE PRACTITIONER

## 2022-06-02 PROCEDURE — 1160F RVW MEDS BY RX/DR IN RCRD: CPT | Mod: CPTII,S$GLB,, | Performed by: NURSE PRACTITIONER

## 2022-06-02 PROCEDURE — 99999 PR PBB SHADOW E&M-EST. PATIENT-LVL IV: CPT | Mod: PBBFAC,,, | Performed by: NURSE PRACTITIONER

## 2022-06-02 PROCEDURE — 1160F PR REVIEW ALL MEDS BY PRESCRIBER/CLIN PHARMACIST DOCUMENTED: ICD-10-PCS | Mod: CPTII,S$GLB,, | Performed by: NURSE PRACTITIONER

## 2022-06-02 PROCEDURE — 4010F ACE/ARB THERAPY RXD/TAKEN: CPT | Mod: CPTII,S$GLB,, | Performed by: NURSE PRACTITIONER

## 2022-06-02 PROCEDURE — 1159F MED LIST DOCD IN RCRD: CPT | Mod: CPTII,S$GLB,, | Performed by: NURSE PRACTITIONER

## 2022-06-02 PROCEDURE — 99999 PR PBB SHADOW E&M-EST. PATIENT-LVL IV: ICD-10-PCS | Mod: PBBFAC,,, | Performed by: NURSE PRACTITIONER

## 2022-06-02 PROCEDURE — 3288F FALL RISK ASSESSMENT DOCD: CPT | Mod: CPTII,S$GLB,, | Performed by: NURSE PRACTITIONER

## 2022-06-02 PROCEDURE — 3008F BODY MASS INDEX DOCD: CPT | Mod: CPTII,S$GLB,, | Performed by: NURSE PRACTITIONER

## 2022-06-02 PROCEDURE — 1101F PR PT FALLS ASSESS DOC 0-1 FALLS W/OUT INJ PAST YR: ICD-10-PCS | Mod: CPTII,S$GLB,, | Performed by: NURSE PRACTITIONER

## 2022-06-02 PROCEDURE — 99205 PR OFFICE/OUTPT VISIT, NEW, LEVL V, 60-74 MIN: ICD-10-PCS | Mod: S$GLB,,, | Performed by: NURSE PRACTITIONER

## 2022-06-02 RX ORDER — TIZANIDINE 4 MG/1
TABLET ORAL
Qty: 30 TABLET | Refills: 11 | Status: SHIPPED | OUTPATIENT
Start: 2022-06-02 | End: 2022-08-23

## 2022-06-02 NOTE — PROGRESS NOTES
"Date of service: 6/2/2022  Referring provider: Sharon Loyd    Subjective:      Chief complaint: Migraine and Headache       Patient ID: Mike Ybarra is a 67 y.o. male with HTN, HLD who presents for new patient evaluation of headache     History of Present Illness    ORIGINAL HEADACHE HISTORY - 6/2/22  Age at onset and course over time: "forever". He reports as a child he got headaches and states he got shots for them. In college he had severe headaches as well. States he "grew out of them". He states he only gets a headache when he lays down. He goes to bed, will wake up a few hours later, sit in a chair and the headache goes away. Then he can go to bed and headache resolved. This has been ongoing for at least five years. This positional component is not every night. Of note, if he puts his head on two pillows, this will improve the headache as well.     He recently was on steroids which improved his headaches as well.     Family history - none he is aware of   Last eye exam - unknown    Location: sinus area (across brow, down nose; like a T zone)  Quality:  [x] pressure [] tight [] throbbing [] sharp [] stabbing   Severity: current 0 with range 0-8  Duration: varies  Frequency: 2-3 days per week  Headaches awaken at night?: yes   Worst time of day: whenever he lays down  Associated with: [] photophobia []  phonophobia [] osmophobia [] blurred vision  [] double vision [] loss of appetite [] nausea [] vomiting [] dizziness [] vertigo  [] tinnitus [] irritability [x] sinus pressure [] problems with concentration   [] neck tightness   Alleviated by:  [] sleep [] darkness [] massage [] heat [] ice [] medication  Exacerbated by:  [] fatigue [] light [] noise [] smells [] coughing [] sneezing  [] bending over [] ovulation [] menses [] alcohol [] change in weather []  stress  Ipsilateral autonomic: [] nasal congestion [] lacrimation [] ptosis [] injection [] edema [] foreign body sensation [] ear fullness "   ICP:  [] transient visual obscurations  [] tinnitus   [x] positional headache  [] non-positional   Sleep habits: good, snoring   Caffeine intake: 2-3 cups coffee  Gyn status (if female): n/a  MIDAS: 0    Current acute treatment:  None    Current prevention:  (olmesartan)    Previously tried/failed acute treatment:  Valium  Tizanidine  Naproxen  Ibuprofen    Previously tried/failed preventative treatment:  (lisinopril)    Review of patient's allergies indicates:   Allergen Reactions    Grass pollen-sweet vernal grass, std.      Current Outpatient Medications   Medication Sig Dispense Refill    fluticasone propionate (FLONASE) 50 mcg/actuation nasal spray 1 spray (50 mcg total) by Each Nostril route once daily. 16 g 5    olmesartan (BENICAR) 20 MG tablet Take 1 tablet (20 mg total) by mouth once daily. 90 tablet 0    rosuvastatin (CRESTOR) 5 MG tablet TAKE 1 TABLET(5 MG) BY MOUTH EVERY DAY 90 tablet 2    tadalafil (CIALIS) 20 MG Tab Take 1 tablet (20 mg total) by mouth once daily. 6 tablet 2    tiZANidine (ZANAFLEX) 4 MG tablet Half or full tablet by mouth at night as needed for muscle spasm 30 tablet 11     No current facility-administered medications for this visit.       Past Medical History  Past Medical History:   Diagnosis Date    Hyperlipemia     Internal hemorrhoids     Lumbar disc disease     intemittent flares       Past Surgical History  Past Surgical History:   Procedure Laterality Date    COLONOSCOPY  11/15/2006  [facility]    Internal small hemorrhoids were found.  Non-thrombosed external hemorrhoids. Perianal skin tag.  Redundant left colon.     HEMORRHOID SURGERY  12/15/2006  [facility]    HERNIA REPAIR      umbilical    TONSILLECTOMY, ADENOIDECTOMY         Family History  Family History   Problem Relation Age of Onset    Cancer Father 77        prostate    Gout Brother     Cancer Brother         Skin    Diabetes Mother     Stroke Mother     Heart disease Mother     Heart attack Paternal  Grandfather        Social History  Social History     Socioeconomic History    Marital status:     Number of children: 5   Occupational History    Occupation: educator     Employer: [employer]   Tobacco Use    Smoking status: Former Smoker     Quit date: 1998     Years since quittin.1    Smokeless tobacco: Never Used   Substance and Sexual Activity    Alcohol use: Yes     Comment: rare    Drug use: No    Sexual activity: Yes     Partners: Female   Social History Narrative    Significant yard work.  No formal exercise     Social Determinants of Health     Financial Resource Strain: Low Risk     Difficulty of Paying Living Expenses: Not very hard   Food Insecurity: No Food Insecurity    Worried About Running Out of Food in the Last Year: Never true    Ran Out of Food in the Last Year: Never true   Transportation Needs: No Transportation Needs    Lack of Transportation (Medical): No    Lack of Transportation (Non-Medical): No   Physical Activity: Sufficiently Active    Days of Exercise per Week: 4 days    Minutes of Exercise per Session: 60 min   Stress: No Stress Concern Present    Feeling of Stress : Only a little   Social Connections: Unknown    Frequency of Communication with Friends and Family: More than three times a week    Frequency of Social Gatherings with Friends and Family: Three times a week    Active Member of Clubs or Organizations: Yes    Attends Club or Organization Meetings: 1 to 4 times per year    Marital Status:    Housing Stability: Low Risk     Unable to Pay for Housing in the Last Year: No    Number of Places Lived in the Last Year: 1    Unstable Housing in the Last Year: No        Review of Systems  14-point review of systems as follows:   No check karri indicates NEGATIVE response   Constitutional: [] weight loss, [] change to appetite   Eyes: [] change in vision, [] double vision   Ears, nose, mouth, throat: [] frequent nose  bleeds, [] ringing in the ears   Respiratory: [] cough, [] wheezing   Cardiovascular: [] chest pain, [] palpitations   Gastrointestinal: [] jaundice, [] nausea/vomiting   Genitourinary: [] incontinence, [] burning with urination   Hematologic/lymphatic: [] easy bruising/bleeding, [] night sweats   Neurological: [] numbness, [] weakness   Endocrine: [] fatigue, [] heat/cold intolerance   Allergy/Immunologic: [] fevers, [] chills   Musculoskeletal: [] muscle pain, [] joint pain   Psychiatric: [] thoughts of harming self/others, [] depression   Integumentary: [] rashes, [] sores that do not heal     Objective:        There were no vitals filed for this visit.  Body mass index is 30.61 kg/m².    6/2/22  Constitutional: appears in no acute distress, well-developed, well-nourished     Eyes: normal conjunctiva, PERRLA    Ears, nose, mouth, throat: external appearance of ears and nose normal, hearing intact, tongue scalloping      Cardiovascular: regular rate and rhythm, no murmurs appreciated    Respiratory: unlabored respirations, breath sounds normal bilaterally    Gastrointestinal: no visible abdominal masses, no guarding, no visible hernia    Musculoskeletal: normal tone in all four extremities. No abnormal movements. No pronator drift. No orbit. Symmetric finger tapping. Normal station. Normal regular gait. Normal tandem gait.      Spine:   CERVICAL SPINE:  ROM: mildly restricted   MUSCLE SPASM: mild, diffuse   FACET LOADING: no   SPURLING: no  SOFI / HOLLY tender: no     Psychiatric: normal judgment and insight. Oriented to person, place, and time.     Neurologic:   Cortical functions: recent and remote memory intact, normal attention span and concentration, speech fluent, adequate fund of knowledge   Cranial nerves: visual fields full, PERRLA, EOMI, symmetric facial strength, hearing intact, palate elevates symmetrically, shoulder shrug 5/5, tongue protrudes midline   Reflexes: 2+ in the upper and lower extremities, no  [place]  Sensation: intact to temperature throughout   Coordination: normal finger to nose, heel to shin, tandem gait     Data Review:     I have personally reviewed the referring provider's notes, labs, & imaging made available to me today.      RADIOLOGY STUDIES:  I have personally reviewed the pertinent images performed.       No results found for this or any previous visit.    Lab Results   Component Value Date     06/21/2021    K 4.3 06/21/2021     06/21/2021    CO2 27 06/21/2021    BUN 12 06/21/2021    CREATININE 0.9 06/21/2021     06/21/2021    HGBA1C 5.7 (H) 08/01/2019    AST 20 06/21/2021    ALT 24 09/22/2021    ALBUMIN 4.2 06/21/2021    PROT 7.4 06/21/2021    BILITOT 0.6 06/21/2021    CHOL 155 09/22/2021    HDL 53 09/22/2021    LDLCALC 80.6 09/22/2021    TRIG 107 09/22/2021       Lab Results   Component Value Date    WBC 7.07 06/21/2021    HGB 15.9 06/21/2021    HCT 47.9 06/21/2021    MCV 91 06/21/2021     06/21/2021       Lab Results   Component Value Date    TSH 2.557 07/23/2013           Assessment & Plan:       Problem List Items Addressed This Visit        Neuro    Intractable migraine without aura and without status migrainosus    Overview     Rare episodes. Headaches are typically moderate to severe in intensity, worsen with activity, pounding in quality and associated with sensitivity to light and sound.                 Cardiac/Vascular    Essential hypertension (Chronic)    Current Assessment & Plan     Patient did not want pressure checked today. On olmesartan              Other Visit Diagnoses     Positional headache    -  Primary    Headaches are worse when laying down and improve when standing up. This is not typically of a CSF leak headache but will image given worsening nature. Possible this is undiagnosed sleep apnea as headaches onset with laying down for sleep/naps and improve with sitting up/waking. Sleep study to evaluate     Relevant Orders    MRI Brain  Without Contrast    Cervical myofascial pain syndrome        Relevant Medications    tiZANidine (ZANAFLEX) 4 MG tablet    Other Relevant Orders    Ambulatory referral/consult to Physical/Occupational Therapy    Bruxism        Relevant Medications    tiZANidine (ZANAFLEX) 4 MG tablet    Other Relevant Orders    Ambulatory referral/consult to Physical/Occupational Therapy    Snoring        Relevant Orders    Ambulatory referral/consult to Sleep Disorders    Sleep disorder        Relevant Orders    Ambulatory referral/consult to Sleep Disorders              Please call our clinic at [phone] or send a message on the [employer] portal if there are any changes to the plan described below, for example,if you are not contacted for the requested tests, referral(s) within one week, if you are unable to receive the medications prescribed, or if you feel you need to change the treatment course for any reason.     TESTING:  -- MRI brain  -- sleep study    REFERRALS:  -- sleep specialist  -- physical therapy    PREVENTION (use daily regardless of headache):  -- continue current medications    AS-NEEDED TREATMENT (use total no more than 10 days per month unless otherwise stated):  -- START tizanidine at night. This is a muscle relaxer and it will also make you potentially sleepy. Start with half a tablet to see how you respond but can take up to a whole tablet if needed      Follow up in about 4 weeks (around 6/30/2022) for follow up with [facility].    Face to Face time with patient: 45  60 minutes of total time spent on the encounter, which includes face to face time and non-face to face time on day of visit preparing to see the patient (eg, review of tests), Obtaining and/or reviewing separately obtained history, Documenting clinical information in the electronic or other health record, Independently interpreting results (not separately reported) and communicating results to the patient/family/caregiver, or Care coordination (not  separately reported).     Kerline Mallory, NP

## 2022-06-02 NOTE — PATIENT INSTRUCTIONS
Please call our clinic at [phone] or send a message on the [employer] portal if there are any changes to the plan described below, for example,if you are not contacted for the requested tests, referral(s) within one week, if you are unable to receive the medications prescribed, or if you feel you need to change the treatment course for any reason.     TESTING:  -- MRI brain  -- sleep study    REFERRALS:  -- sleep specialist  -- physical therapy    PREVENTION (use daily regardless of headache):  -- continue current medications    AS-NEEDED TREATMENT (use total no more than 10 days per month unless otherwise stated):  -- START tizanidine at night. This is a muscle relaxer and it will also make you potentially sleepy. Start with half a tablet to see how you respond but can take up to a whole tablet if needed

## 2022-06-13 ENCOUNTER — PATIENT MESSAGE (OUTPATIENT)
Dept: NEUROLOGY | Facility: CLINIC | Age: 67
End: 2022-06-13
Payer: COMMERCIAL

## 2022-06-13 ENCOUNTER — TELEPHONE (OUTPATIENT)
Dept: NEUROLOGY | Facility: CLINIC | Age: 67
End: 2022-06-13
Payer: COMMERCIAL

## 2022-06-13 RX ORDER — DIAZEPAM 10 MG/1
10 TABLET ORAL
Qty: 2 TABLET | Refills: 0 | Status: SHIPPED | OUTPATIENT
Start: 2022-06-13 | End: 2022-08-23 | Stop reason: ALTCHOICE

## 2022-06-13 NOTE — TELEPHONE ENCOUNTER
----- Message from Rafael Nayak sent at 6/13/2022  3:00 PM CDT -----  Regarding: med for MRI  Type: Needs Medical Advice    Who Called:  Mike Whitehead Call Back Number: [phone]     Additional Information:  has not heard from office or had med sent to pharm. MRI is tomorrow at 4pm pt is anxious about getting med in time. If pt does not get med will not be able to do MRI.

## 2022-06-15 ENCOUNTER — PATIENT MESSAGE (OUTPATIENT)
Dept: NEUROLOGY | Facility: CLINIC | Age: 67
End: 2022-06-15
Payer: COMMERCIAL

## 2022-06-15 ENCOUNTER — TELEPHONE (OUTPATIENT)
Dept: NEUROLOGY | Facility: CLINIC | Age: 67
End: 2022-06-15
Payer: COMMERCIAL

## 2022-06-15 ENCOUNTER — CLINICAL SUPPORT (OUTPATIENT)
Dept: REHABILITATION | Facility: HOSPITAL | Age: 67
End: 2022-06-15
Payer: COMMERCIAL

## 2022-06-15 DIAGNOSIS — F45.8 BRUXISM: ICD-10-CM

## 2022-06-15 DIAGNOSIS — M79.18 CERVICAL MYOFASCIAL PAIN SYNDROME: ICD-10-CM

## 2022-06-15 PROCEDURE — 97110 THERAPEUTIC EXERCISES: CPT | Mod: PO

## 2022-06-15 PROCEDURE — 97140 MANUAL THERAPY 1/> REGIONS: CPT | Mod: PO

## 2022-06-15 PROCEDURE — 97161 PT EVAL LOW COMPLEX 20 MIN: CPT | Mod: PO

## 2022-06-15 NOTE — TELEPHONE ENCOUNTER
Called patient and explained that per Kerline Mallory NP that the Sinus retention cyst is incidental and likely not causing problems however he can be referred to ENT to further evaluate. What Kerline Mallory NP looked at was normal but to let patient know that sinus cysts are very common and he can see the ENT if he prefers. Patient stated that since starting the tizanidine that the headaches have improved greatly. Reminded him of the follow up appointment. Verbalized understanding.

## 2022-06-15 NOTE — PATIENT INSTRUCTIONS
Access Code: [email or web address]  URL: [email or web address]  Date: 06/15/2022  Prepared by: Damion Belle    Exercises  Supine Cervical Retraction with Towel - 1 x daily - 7 x weekly - 3 sets - 12 reps - 5 hold  Side Lying Open Book - 1 x daily - 7 x weekly - 1-2 sets - 10-15 reps - 5 hold - 3/10 intensity  Quadruped Scapular Protraction and Retraction - 1 x daily - 7 x weekly - 1-2 sets - 10-15 reps - 2 hold - 3/10 intensity  Quadruped Full Range Thoracic Rotation with Reach - 1 x daily - 7 x weekly - 1-2 sets - 10-15 reps - 2 hold - 3/10 intensity

## 2022-06-15 NOTE — PLAN OF CARE
[facility]   Physical Therapy Initial Evaluation     Date: 6/15/2022   Name: Mike Ruiz [facility] Number: [id number]    Therapy Diagnosis:   Encounter Diagnoses   Name Primary?    Cervical myofascial pain syndrome     Bruxism      Physician: Kerline Mallory NP    Physician Orders: PT Eval and Treat  Medical Diagnosis from Referral:   M79.18 (ICD-10-CM) - Cervical myofascial pain syndrome   F45.8 (ICD-10-CM) - Bruxism       Evaluation Date: 6/15/2022  Authorization Period Expiration: 12/31/2022  Plan of Care Expiration: 08/16/2022  Progress Note Due: 6th visit or 07/16/2022  Visit # / Visits authorized: 1/ 20   FOTO: Completed on 06/16/2022     Precautions: Standard    Time In: 1004  Time Out: 1100  Total Appointment Time (timed & untimed codes): 56 minutes      SUBJECTIVE   Date of onset: Chronic    History of current condition - Mike reports:   Positional and seasonal headaches since he was a kid. It got worse in college with stress and maybe increased seasonal allergies. It seems to occur when he lies down and can resolve quickly once he sits/stands up for a 20-30 min. The pain is located around the bridge of his nose, but can spread out. The headache usually does not return if he lays down again. Headache frequency/provocation not consistent.     He knows he grinds his teeth and tends to clench, but has no issues chewing (is vegetarian so avoiding hard/chewy foods anyway)    He is currently taking 1/2 prescribed Tizanidine every other day and feels it has been helpful so far. He does not like to take medication. The only other things he uses consistently is CBD oil for his lower back.    Patient has history of low back pain and is very cautious to avoid straining his back. He avoids any weight training or heavy lifting that might put too much pressure on his back. He takes a lot of breaks if he has to work out in the yard.     Walking mostly for exercise. Avoids  weightlifting due to pressure or sensitivity to the low back.    Difficulty falling asleep, slowing down his thoughts, but improved with self-meditation (thinking about Seinfeld).    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 Learn exercises and stretches to manage.     Imaging, None    Medical History:   Past Medical History:   Diagnosis Date    Hyperlipemia     Internal hemorrhoids     Lumbar disc disease     intemittent flares       Surgical History:   Mike Ybarra  has a past surgical history that includes Hemorrhoid surgery (12/15/2006  Shayy); TONSILLECTOMY, ADENOIDECTOMY; Hernia repair; and Colonoscopy (11/15/2006  Raul).    Medications:   Mike has a current medication list which includes the following prescription(s): diazepam, fluticasone propionate, olmesartan, rosuvastatin, tadalafil, and tizanidine.    Allergies:   Review of patient's allergies indicates:   Allergen Reactions    Grass pollen-sweet vernal grass, std.           OBJECTIVE     Please answer the following questions about ALL of the headaches you have had over the last 3 months.  Select your answer in the box next to each question. Select zero if you did not have the activity in the last  3 months.  1. On how many days in the last 3 months did you miss work or school because of your headaches?   0  2. How many days in the  last 3 months was your productivity at work or school reduced by half or more  because of your headaches? (Do not include days you counted in question 1 where you missed work or  school.)   0  3. On how many days in the last 3 months did you not do household work (such as housework, home  repairs and maintenance, shopping, caring for children and relatives) because of your headaches?   0  4. How many days in the last 3 months was your productivity in household work reduced by half of more  because of your headaches? (Do not include days you counted in question 3 where you did not do  household work.)   0  5. On how many days in the last 3 months did you miss family, social or leisure activities because of your  headaches?   0  Total (Questions 1-5): 0  MIDAS Grade Definition MIDAS Score  I (Little or No Disability 0-5)  II (Mild Disability 6-10)  III (Moderate Disability 11-20)  IV (Severe Disability 21+)      Functional Screen:     SFMA FN: functional, nonpainful. FP: functional, painful. DP: dysfunctional, painful. DN: dysfunctional, nonpainful.   AROM cervical flexion Dysfunctional, non-painful   AROM cervical extension Dysfunctional, non-painful   AROM cervical rotation R: Functional, non-painful 70  L: Functional, non-painful 70   AROM Shoulder ER R: Dysfunctional, non-painful  L: Functional, non-painful   AROM Shoulder IR R: Dysfunctional, non-painful  L: Functional, non-painful   multi-segmental flexion  Dysfunctional, non-painful   multi-segmental extension Dysfunctional, non-painful   multi-segmental rotation  R: Dysfunctional, non-painful  L: Dysfunctional, non-painful   SLS R: Dysfunctional, non-painful  L: Dysfunctional, non-painful   Arms Down Deep Squat Dysfunctional, non-painful     Myotomes: Grossly WNL, symmetrical    Seated Thoracic Range of Motion:    % Pain   Right Rotation 75 (-)   Left Rotation 90 (-)     Shoulder range of motion: grossly symmetrical and WNL, except:  Right ER AROM to T2, PROM  0-90 deg firm  Right IR AROM to T8, PROM 0-25 deg firm    upper extremity MMT: grossly 5/5, symmetrical, except:  Scapular retraction, extension 4+/5 bilaterally     Special Tests (cervical):  Compression (-)   Distraction (-)   Spurlings (-)   Lateral Flexion Alar Ligament (-)   DNF test >25 sec     TMJ:  Grossly symmetrical lateral glides  No deviation noted with full opening  Mild tender to palpation masseters bilaterally     Joint Mobility: *  Cervical: HYPOmobile  Thoracic: HYPOmobile      Scapular: HYPOmobile     Palpation: Denies tenderness to cervical spine palpation.    Sensation: Intact to cervical dermatomes    Limitation/Restriction for FOTO Orofascial Survey    Therapist reviewed FOTO scores for Mike Ybarra on 6/15/2022.   FOTO documents entered into [employer] - see Media section.    Limitation Score: 52         TREATMENT     Total Treatment time (time-based codes) separate from Evaluation: 25 minutes     Mike received the treatments listed below:      Mike received therapeutic exercises to develop strength and posture for 15 minutes including:  Supine cervical retraction 2x15  Side lying open book x10 bilaterally   Quadruped protraction/retraction x15  Quadruped thoracic rotation x10 bilaterally     Mike received the following manual therapy techniques: Joint mobilizations, Manual traction, Myofacial release and Soft tissue Mobilization were applied to the: cervical/thoracic spine for 10 minutes, including:  Cervical traction  Soft tissue mobilization, cross friction massage cervical/thoracic paravertebral muscles, upper trapezius, Levator Scapulae, masseters, sternocleidomastoid   Grade III/IV cervical/thoracic PA mobilization      PATIENT EDUCATION AND HOME EXERCISES     Education provided:   - Presentation, prognosis, plan of care, HEP     Written Home Exercises Provided: yes. Exercises were reviewed and Mike was able to demonstrate them prior to the end of the session.  Mike  demonstrated good  understanding of the education provided. See EMR under Patient Instructions for exercises provided during therapy sessions.    Images copyright of [email or web address] or [email or web address]    ASSESSMENT     Mike is a 67 y.o. male referred to outpatient Physical Therapy with a medical diagnosis of cervical myofascial pain and bruxism. Patient demonstrated slight impairments in cervical/thoracic joint mobility, periscapular strength, and muscle length which may be contributing to his symptoms, but symptoms were not provoked with any testing at today's session. Patient's symptoms responding well to new prescription medication and he is not sure he wants to commit to physical therapy. He would likely benefit from brief period of skilled PT to address postural compensations and ensure independence with progressive HEP.     Patient prognosis is Good.     Patient will benefit from skilled outpatient Physical Therapy to address the deficits stated above and in the chart below, provide patient /family education, and to maximize patient's level of independence.     Plan of care discussed with patient: Yes  Patient's spiritual, cultural and educational needs considered and patient is agreeable to the plan of care and goals as stated below:     Anticipated Barriers for therapy: financial    Medical Necessity is demonstrated by the following  History  Co-morbidities and personal factors that may impact the plan of care Co-morbidities:   None    Personal Factors:   no deficits     low   Examination  Body Structures and Functions, activity limitations and participation restrictions that may impact the plan of care Body Regions:   head  neck  upper extremities    Body Systems:    ROM  strength    Participation Restrictions:   None    Activity limitations:   Learning and applying knowledge  no deficits    General Tasks and Commands  no deficits    Communication  no deficits    Mobility  no deficits    Self  care  no deficits    Domestic Life  no deficits    Interactions/Relationships  no deficits    Life Areas  no deficits    Community and Social Life  no deficits         low   Clinical Presentation stable and uncomplicated low   Decision Making/ Complexity Score: low     Goals:  Short Term Goals: 2 weeks   Patient will be independent with HEP for symptom management  Patient will increase range of motion >5 deg in thoracic, shoulder in all affected planes to improve functional mobility  Patient will report >25% improvement in headache symptoms      PLAN   Plan of care Certification: 6/15/2022 to 07/01/2022.    Outpatient Physical Therapy 2 times weekly for 2 weeks to include the following interventions: Manual Therapy, Neuromuscular Re-ed, Patient Education, Therapeutic Activities and Therapeutic Exercise.     Damion Belle, PT      I CERTIFY THE NEED FOR THESE SERVICES FURNISHED UNDER THIS PLAN OF TREATMENT AND WHILE UNDER MY CARE   Physician's comments:     Physician's Signature: ___________________________________________________

## 2022-06-16 ENCOUNTER — PATIENT MESSAGE (OUTPATIENT)
Dept: NEUROLOGY | Facility: CLINIC | Age: 67
End: 2022-06-16
Payer: COMMERCIAL

## 2022-06-23 ENCOUNTER — PATIENT MESSAGE (OUTPATIENT)
Dept: FAMILY MEDICINE | Facility: CLINIC | Age: 67
End: 2022-06-23
Payer: COMMERCIAL

## 2022-06-27 ENCOUNTER — PATIENT MESSAGE (OUTPATIENT)
Dept: NEUROLOGY | Facility: CLINIC | Age: 67
End: 2022-06-27
Payer: COMMERCIAL

## 2022-06-29 ENCOUNTER — TELEPHONE (OUTPATIENT)
Dept: FAMILY MEDICINE | Facility: CLINIC | Age: 67
End: 2022-06-29
Payer: COMMERCIAL

## 2022-06-29 ENCOUNTER — PATIENT MESSAGE (OUTPATIENT)
Dept: FAMILY MEDICINE | Facility: CLINIC | Age: 67
End: 2022-06-29
Payer: COMMERCIAL

## 2022-06-29 NOTE — TELEPHONE ENCOUNTER
----- Message from Shanon Olson sent at 6/29/2022  8:20 AM CDT -----  Contact: patient  Type: Needs Medical Advice  Who Called:  patient  Symptoms (please be specific):  hemorrhoids are enflamed and painful  How long has patient had these symptoms:  couple weeks   Pharmacy name and phone #:    [facility] #[id number] - [facility] [id number] [place] [address] AT [facility] [address]  [address] [place] [id number]  Phone: [phone] Fax: [phone]  Best Call Back Number: [phone] (home)   Additional Information: patient has called and sent several portal messages, he is just requesting a call back to give him some advice on what he can use to get some relief. Please advise

## 2022-08-23 ENCOUNTER — LAB VISIT (OUTPATIENT)
Dept: LAB | Facility: HOSPITAL | Age: 67
End: 2022-08-23
Attending: EMERGENCY MEDICINE
Payer: COMMERCIAL

## 2022-08-23 ENCOUNTER — OFFICE VISIT (OUTPATIENT)
Dept: FAMILY MEDICINE | Facility: CLINIC | Age: 67
End: 2022-08-23
Payer: COMMERCIAL

## 2022-08-23 VITALS
SYSTOLIC BLOOD PRESSURE: 134 MMHG | OXYGEN SATURATION: 96 % | WEIGHT: 241.19 LBS | BODY MASS INDEX: 29.99 KG/M2 | DIASTOLIC BLOOD PRESSURE: 82 MMHG | HEIGHT: 75 IN | HEART RATE: 60 BPM

## 2022-08-23 DIAGNOSIS — I10 ESSENTIAL HYPERTENSION: Primary | Chronic | ICD-10-CM

## 2022-08-23 DIAGNOSIS — E78.2 MIXED HYPERLIPIDEMIA: Chronic | ICD-10-CM

## 2022-08-23 DIAGNOSIS — Z12.5 PROSTATE CANCER SCREENING: ICD-10-CM

## 2022-08-23 DIAGNOSIS — Z80.42 FAMILY HISTORY OF PROSTATE CANCER: Chronic | ICD-10-CM

## 2022-08-23 DIAGNOSIS — I10 ESSENTIAL HYPERTENSION: Chronic | ICD-10-CM

## 2022-08-23 LAB
ALBUMIN SERPL BCP-MCNC: 4 G/DL (ref 3.5–5.2)
ALP SERPL-CCNC: 60 U/L (ref 55–135)
ALT SERPL W/O P-5'-P-CCNC: 28 U/L (ref 10–44)
ANION GAP SERPL CALC-SCNC: 4 MMOL/L (ref 8–16)
AST SERPL-CCNC: 23 U/L (ref 10–40)
BILIRUB SERPL-MCNC: 0.9 MG/DL (ref 0.1–1)
BUN SERPL-MCNC: 11 MG/DL (ref 8–23)
CALCIUM SERPL-MCNC: 9.1 MG/DL (ref 8.7–10.5)
CHLORIDE SERPL-SCNC: 102 MMOL/L (ref 95–110)
CHOLEST SERPL-MCNC: 159 MG/DL (ref 120–199)
CHOLEST/HDLC SERPL: 3 {RATIO} (ref 2–5)
CO2 SERPL-SCNC: 29 MMOL/L (ref 23–29)
COMPLEXED PSA SERPL-MCNC: 1.6 NG/ML (ref 0–4)
CREAT SERPL-MCNC: 0.8 MG/DL (ref 0.5–1.4)
EST. GFR  (NO RACE VARIABLE): >60 ML/MIN/1.73 M^2
GLUCOSE SERPL-MCNC: 96 MG/DL (ref 70–110)
HDLC SERPL-MCNC: 53 MG/DL (ref 40–75)
HDLC SERPL: 33.3 % (ref 20–50)
LDLC SERPL CALC-MCNC: 82.8 MG/DL (ref 63–159)
NONHDLC SERPL-MCNC: 106 MG/DL
POTASSIUM SERPL-SCNC: 3.8 MMOL/L (ref 3.5–5.1)
PROT SERPL-MCNC: 6.8 G/DL (ref 6–8.4)
SODIUM SERPL-SCNC: 135 MMOL/L (ref 136–145)
TRIGL SERPL-MCNC: 116 MG/DL (ref 30–150)

## 2022-08-23 PROCEDURE — 1126F AMNT PAIN NOTED NONE PRSNT: CPT | Mod: CPTII,S$GLB,, | Performed by: EMERGENCY MEDICINE

## 2022-08-23 PROCEDURE — 1126F PR PAIN SEVERITY QUANTIFIED, NO PAIN PRESENT: ICD-10-PCS | Mod: CPTII,S$GLB,, | Performed by: EMERGENCY MEDICINE

## 2022-08-23 PROCEDURE — 3075F SYST BP GE 130 - 139MM HG: CPT | Mod: CPTII,S$GLB,, | Performed by: EMERGENCY MEDICINE

## 2022-08-23 PROCEDURE — 99999 PR PBB SHADOW E&M-EST. PATIENT-LVL III: CPT | Mod: PBBFAC,,, | Performed by: EMERGENCY MEDICINE

## 2022-08-23 PROCEDURE — 84153 ASSAY OF PSA TOTAL: CPT | Performed by: EMERGENCY MEDICINE

## 2022-08-23 PROCEDURE — 99397 PR PREVENTIVE VISIT,EST,65 & OVER: ICD-10-PCS | Mod: S$GLB,,, | Performed by: EMERGENCY MEDICINE

## 2022-08-23 PROCEDURE — 4010F PR ACE/ARB THEARPY RXD/TAKEN: ICD-10-PCS | Mod: CPTII,S$GLB,, | Performed by: EMERGENCY MEDICINE

## 2022-08-23 PROCEDURE — 4010F ACE/ARB THERAPY RXD/TAKEN: CPT | Mod: CPTII,S$GLB,, | Performed by: EMERGENCY MEDICINE

## 2022-08-23 PROCEDURE — 36415 COLL VENOUS BLD VENIPUNCTURE: CPT | Mod: PO | Performed by: EMERGENCY MEDICINE

## 2022-08-23 PROCEDURE — 3079F DIAST BP 80-89 MM HG: CPT | Mod: CPTII,S$GLB,, | Performed by: EMERGENCY MEDICINE

## 2022-08-23 PROCEDURE — 3008F PR BODY MASS INDEX (BMI) DOCUMENTED: ICD-10-PCS | Mod: CPTII,S$GLB,, | Performed by: EMERGENCY MEDICINE

## 2022-08-23 PROCEDURE — 1159F PR MEDICATION LIST DOCUMENTED IN MEDICAL RECORD: ICD-10-PCS | Mod: CPTII,S$GLB,, | Performed by: EMERGENCY MEDICINE

## 2022-08-23 PROCEDURE — 80061 LIPID PANEL: CPT | Performed by: EMERGENCY MEDICINE

## 2022-08-23 PROCEDURE — 3008F BODY MASS INDEX DOCD: CPT | Mod: CPTII,S$GLB,, | Performed by: EMERGENCY MEDICINE

## 2022-08-23 PROCEDURE — 1159F MED LIST DOCD IN RCRD: CPT | Mod: CPTII,S$GLB,, | Performed by: EMERGENCY MEDICINE

## 2022-08-23 PROCEDURE — 3079F PR MOST RECENT DIASTOLIC BLOOD PRESSURE 80-89 MM HG: ICD-10-PCS | Mod: CPTII,S$GLB,, | Performed by: EMERGENCY MEDICINE

## 2022-08-23 PROCEDURE — 99397 PER PM REEVAL EST PAT 65+ YR: CPT | Mod: S$GLB,,, | Performed by: EMERGENCY MEDICINE

## 2022-08-23 PROCEDURE — 3075F PR MOST RECENT SYSTOLIC BLOOD PRESS GE 130-139MM HG: ICD-10-PCS | Mod: CPTII,S$GLB,, | Performed by: EMERGENCY MEDICINE

## 2022-08-23 PROCEDURE — 80053 COMPREHEN METABOLIC PANEL: CPT | Performed by: EMERGENCY MEDICINE

## 2022-08-23 PROCEDURE — 99999 PR PBB SHADOW E&M-EST. PATIENT-LVL III: ICD-10-PCS | Mod: PBBFAC,,, | Performed by: EMERGENCY MEDICINE

## 2022-08-23 RX ORDER — DIOSMIN COMPLEX NO.1 630 MG
1 TABLET ORAL 2 TIMES DAILY
COMMUNITY
Start: 2022-07-11

## 2022-08-23 RX ORDER — BISACODYL 5 MG/1
TABLET, COATED ORAL
COMMUNITY
Start: 2022-07-11 | End: 2023-08-25

## 2022-08-23 RX ORDER — BISACODYL 5 MG
TABLET, DELAYED RELEASE (ENTERIC COATED) ORAL
COMMUNITY
Start: 2022-07-11

## 2022-08-23 RX ORDER — POLYETHYLENE GLYCOL 3350 17 G/17G
POWDER, FOR SOLUTION ORAL
COMMUNITY
Start: 2022-07-11

## 2022-08-23 RX ORDER — FLUTICASONE PROPIONATE 50 MCG
50 SPRAY, SUSPENSION (ML) NASAL
COMMUNITY
Start: 2022-03-14 | End: 2022-08-23

## 2022-08-23 RX ORDER — TADALAFIL 20 MG/1
20 TABLET ORAL DAILY
Qty: 6 TABLET | Refills: 2 | Status: SHIPPED | OUTPATIENT
Start: 2022-08-23 | End: 2023-08-25

## 2022-08-23 RX ORDER — ROSUVASTATIN CALCIUM 5 MG/1
5 TABLET, COATED ORAL DAILY
COMMUNITY
Start: 2022-07-04 | End: 2022-08-23

## 2022-08-23 NOTE — PROGRESS NOTES
Subjective:   THIS NOTE IS DONE WITH VOICE RECOGNITION        Patient ID: Mike Ybarra is a 67 y.o. male.    Chief Complaint: Annual Exam (Blood work )         Assessment:       1. Essential hypertension    2. Mixed hyperlipidemia    3. Family history of prostate cancer    4. Prostate cancer screening        Plan:       1. Essential hypertension  Controlled.  Continue current medications  Continue to avoid excessive sodium  Continue home monitoring  Target blood pressure is 139/89 or less  - Comprehensive Metabolic Panel; Future    2. Mixed hyperlipidemia  Lipids controlled  Continue current medications  Annual lipid profile and comprehensive metabolic panel  If complications developed such as myalgia or arthralgia, contact me.  - Comprehensive Metabolic Panel; Future  - Lipid Panel; Future    3. Family history of prostate cancer  Continued annual screening PSA recommended   A    4. Prostate cancer screening  Update today   No issues in the past   Family history of prostate cancer     - PSA, Screening; Future        HPI     He is scheduled for colonoscopy tomorrow with Dr. Katz.  He is now eating mostly vegan.  I did request that we get a copy of that colonoscopy.    Here to follow up on blood pressure.  Taking current medications.  He is not experiencing significant palpitations.  No syncope.    BP Readings from Last 3 Encounters:   08/23/22 134/82   03/14/22 134/80   06/22/21 134/84     Using CBD oil for headache and back.  Dr. Monroe has prescribed this.  This is been very helpful for him the plan is to continue.        Brain MRI June 2022    Impression:     1. No acute intracranial processes.  No definite signs for intracranial hemorrhages or abnormal mass effects.  2. Right sphenoid sinus retention cyst or polyp.    Hyperlipidemia monitoring.  Currently using rosuvastatin 5 milligrams daily, without side effects.    Lab Results   Component Value Date    CHOL 155 09/22/2021    CHOL 230 (H)  06/21/2021    CHOL 226 (H) 12/11/2020     Lab Results   Component Value Date    HDL 53 09/22/2021    HDL 52 06/21/2021    HDL 55 12/11/2020     Lab Results   Component Value Date    LDLCALC 80.6 09/22/2021    LDLCALC 135.2 06/21/2021    LDLCALC 146.4 12/11/2020     Lab Results   Component Value Date    TRIG 107 09/22/2021    TRIG 214 (H) 06/21/2021    TRIG 123 12/11/2020     Lab Results   Component Value Date    CHOLHDL 34.2 09/22/2021    CHOLHDL 22.6 06/21/2021    CHOLHDL 24.3 12/11/2020     He is anticipating continues to screen PSA secondary to family history.      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  Successfully.    No change in social history, surgical history, medical history, or family history.    Immunization History   Administered Date(s) Administered    COVID-19, MRNA, LN-S, PF (Pfizer) (Purple Cap) 03/18/2021, 04/08/2021    Meningococcal Conjugate (MCV4P) 01/09/2009    PPD Test 01/09/2009, 01/12/2009    Pneumococcal Polysaccharide - 23 Valent 06/22/2021     Prevnar vaccine recommended at his convenience.    Influenza and COVID vaccines recommended this fall.    Current Outpatient Medications   Medication Sig Dispense Refill    bisacodyL (DULCOLAX) 5 mg EC tablet As directed on prep sheet      fluticasone propionate (FLONASE) 50 mcg/actuation nasal spray 1 spray (50 mcg total) by Each Nostril route once daily. 16 g 5    LAXATIVE, BISACODYL, 5 mg Tab Take by mouth.      MIRALAX 17 gram/dose powder As directed on prep sheet.      olmesartan (BENICAR) 20 MG tablet TAKE 1 TABLET(20 MG) BY MOUTH EVERY DAY 90 tablet 0    rosuvastatin (CRESTOR) 5 MG tablet TAKE 1 TABLET(5 MG) BY MOUTH EVERY DAY 90 tablet 2    VASCULERA 630 mg Tab Take 1 tablet by mouth 2 (two) times daily.      tadalafil (CIALIS) 20 MG Tab Take 1 tablet (20 mg total) by mouth once daily. 6 tablet 2     No current  facility-administered medications for this visit.         Review of Systems   Constitutional:  Negative for activity change, chills, fever and unexpected weight change.   HENT:  Negative for hearing loss, nosebleeds and tinnitus.    Eyes:  Negative for discharge and itching.   Respiratory:  Negative for cough, choking, chest tightness, shortness of breath and wheezing.    Cardiovascular:  Negative for chest pain, palpitations and leg swelling.   Gastrointestinal:  Negative for abdominal distention.   Genitourinary:  Negative for difficulty urinating, dysuria, flank pain, hematuria and urgency.   Musculoskeletal:  Negative for arthralgias, back pain and joint swelling.   Neurological:  Negative for dizziness, tremors, seizures, numbness and headaches.   Psychiatric/Behavioral:  Negative for confusion, dysphoric mood and hallucinations.      Objective:      Physical Exam  Vitals reviewed.   Constitutional:       General: He is not in acute distress.     Appearance: He is well-developed.   HENT:      Head: Normocephalic and atraumatic.      Right Ear: External ear normal.      Left Ear: External ear normal.      Nose: Nose normal.      Mouth/Throat:      Pharynx: No oropharyngeal exudate.   Eyes:      General: No scleral icterus.     Conjunctiva/sclera: Conjunctivae normal.      Pupils: Pupils are equal, round, and reactive to light.   Neck:      Thyroid: No thyromegaly.   Cardiovascular:      Rate and Rhythm: Normal rate and regular rhythm.      Pulses: Normal pulses.      Heart sounds: Normal heart sounds. No murmur heard.  Pulmonary:      Effort: Pulmonary effort is normal.      Breath sounds: Normal breath sounds. No wheezing or rales.   Abdominal:      General: Bowel sounds are normal. There is no distension.      Palpations: Abdomen is soft.      Tenderness: There is no abdominal tenderness.   Musculoskeletal:         General: No tenderness. Normal range of motion.      Cervical back: Normal range of motion and  neck supple.   Lymphadenopathy:      Cervical: No cervical adenopathy.   Skin:     General: Skin is warm and dry.      Capillary Refill: Capillary refill takes less than 2 seconds.      Findings: No rash.   Neurological:      General: No focal deficit present.      Mental Status: He is alert and oriented to person, place, and time.      Motor: No abnormal muscle tone.      Coordination: Coordination normal.      Deep Tendon Reflexes: Reflexes are normal and symmetric.   Psychiatric:         Mood and Affect: Mood normal.         Behavior: Behavior normal.         Thought Content: Thought content normal.

## 2022-08-24 ENCOUNTER — PATIENT MESSAGE (OUTPATIENT)
Dept: FAMILY MEDICINE | Facility: CLINIC | Age: 67
End: 2022-08-24
Payer: COMMERCIAL

## 2022-08-24 LAB — CRC RECOMMENDATION EXT: NORMAL

## 2022-08-25 ENCOUNTER — PATIENT MESSAGE (OUTPATIENT)
Dept: FAMILY MEDICINE | Facility: CLINIC | Age: 67
End: 2022-08-25
Payer: COMMERCIAL

## 2022-09-06 ENCOUNTER — PATIENT MESSAGE (OUTPATIENT)
Dept: FAMILY MEDICINE | Facility: CLINIC | Age: 67
End: 2022-09-06
Payer: COMMERCIAL

## 2022-09-06 ENCOUNTER — PATIENT OUTREACH (OUTPATIENT)
Dept: ADMINISTRATIVE | Facility: HOSPITAL | Age: 67
End: 2022-09-06
Payer: COMMERCIAL

## 2023-08-01 ENCOUNTER — PATIENT OUTREACH (OUTPATIENT)
Dept: ADMINISTRATIVE | Facility: HOSPITAL | Age: 68
End: 2023-08-01
Payer: COMMERCIAL

## 2023-08-01 NOTE — PROGRESS NOTES
Population Health Chart Review & Patient Outreach Details:     Reason for Outreach Encounter:     [x]  Non-Compliant Report   []  Payor Report (Humana, PHN, BCBS, MSSP, MCIP, UHC, etc.)   [x]  Chart Review     Updates Requested / Reviewed:     [x]  Care Everywhere    []     []  External Sources ([facility], [facility], DIS, etc.)   []  Care Team Updated    Patient Outreach Method:    []  Telephone Outreach Completed   [] Successful   [] Left Voicemail   [] Unable to Contact (wrong number, no voicemail)  []  [facility] Portal Outreach Sent  []  Letter Outreach Mailed  []  Fax Sent for External Records  []  External Records Upload    Health Maintenance Topics Addressed and Outreach Outcomes / Actions Taken:        []      Breast Cancer Screening []  Mammo Scheduled      []  External Records Requested     []  Added Reminder to Complete to Upcoming Primary Care Appt Notes     []  Patient Declined     []  Patient Will Call Back to Schedule     []  Patient Will Schedule with External Provider / Order Routed if Applicable             []       Cervical Cancer Screening []  Pap Scheduled      []  External Records Requested     []  Added Reminder to Complete to Upcoming Primary Care Appt Notes     []  Patient Declined     []  Patient Will Call Back to Schedule     []  Patient Will Schedule with External Provider               []          Colorectal Cancer Screening []  Colonoscopy Case Request or Referral Placed     []  External Records Requested     []  Added Reminder to Complete to Upcoming Primary Care Appt Notes     []  Patient Declined     []  Patient Will Call Back to Schedule     []  Patient Will Schedule with External Provider     []  Fit Kit Mailed (add the SmartPhrase under additional notes)     []  Reminded Patient to Complete Home Test             []      Diabetic Eye Exam []  Eye Camera Scheduled or Optometry Referral Placed     []  External Records Requested     []  Added Reminder to Complete to Upcoming  Primary Care Appt Notes     []  Patient Declined     []  Patient Will Call Back to Schedule     []  Patient Will Schedule with External Provider             []      Blood Pressure Control []  Primary Care Follow Up Visit Scheduled     []  Remote Blood Pressure Reading Captured     []  Added Reminder to Complete to Upcoming Primary Care Appt Notes     []  Patient Declined     []  Patient Will Call Back / Patient Will Send Portal Message with Reading     []  Patient Will Call Back to Schedule Provider Visit             []       HbA1c & Other Labs []  Lab Appt Scheduled for Due Labs     []  Primary Care Follow Up Visit Scheduled      []  Reminded Patient to Complete Home Test     []  Added Reminder to Complete to Upcoming Primary Care Appt Notes     []  Patient Declined     []  Patient Will Call Back to Schedule     []  Patient Will Schedule with External Provider / Order Routed if Applicable           [x]    Schedule Primary Care Appt [x]  Primary Care Appt Scheduled     []  Patient Declined     []  Patient Will Call Back to Schedule     []  Pt Established with External Provider & Updated Care Team             []      Medication Adherence []  Primary Care Appointment Scheduled     []  Added Reminder to Upcoming Primary Care Appt Notes     []  Patient Reminded to  Prescription     []  Patient Declined, Provider Notified if Needed     []  Sent Provider Message to Review and/or Add Exclusion to Problem List             []      Osteoporosis Screening []  DXA Appointment Scheduled     []  External Records Requested     []  Added Reminder to Complete to Upcoming Primary Care Appt Notes     []  Patient Declined     []  Patient Will Call Back to Schedule     []  Patient Will Schedule with External Provider / Order Routed if Applicable     Additional Care Coordinator Notes:    The patient will have an annual visit on 8/25/23 with Dr. Tanvir Mallory.    Further Action Needed If Patient Returns Outreach:

## 2023-08-25 ENCOUNTER — LAB VISIT (OUTPATIENT)
Dept: LAB | Facility: HOSPITAL | Age: 68
End: 2023-08-25
Attending: INTERNAL MEDICINE
Payer: COMMERCIAL

## 2023-08-25 ENCOUNTER — OFFICE VISIT (OUTPATIENT)
Dept: FAMILY MEDICINE | Facility: CLINIC | Age: 68
End: 2023-08-25
Payer: COMMERCIAL

## 2023-08-25 VITALS
TEMPERATURE: 98 F | BODY MASS INDEX: 30.04 KG/M2 | HEART RATE: 74 BPM | OXYGEN SATURATION: 96 % | HEIGHT: 75 IN | WEIGHT: 241.63 LBS | DIASTOLIC BLOOD PRESSURE: 80 MMHG | SYSTOLIC BLOOD PRESSURE: 128 MMHG

## 2023-08-25 DIAGNOSIS — Z00.00 ROUTINE PHYSICAL EXAMINATION: Primary | ICD-10-CM

## 2023-08-25 DIAGNOSIS — E78.5 DYSLIPIDEMIA: ICD-10-CM

## 2023-08-25 DIAGNOSIS — Z12.5 ENCOUNTER FOR SCREENING FOR MALIGNANT NEOPLASM OF PROSTATE: ICD-10-CM

## 2023-08-25 DIAGNOSIS — Z79.899 ENCOUNTER FOR LONG-TERM (CURRENT) USE OF MEDICATIONS: ICD-10-CM

## 2023-08-25 DIAGNOSIS — I10 ESSENTIAL HYPERTENSION: ICD-10-CM

## 2023-08-25 DIAGNOSIS — Z00.00 ROUTINE PHYSICAL EXAMINATION: ICD-10-CM

## 2023-08-25 DIAGNOSIS — E78.2 MIXED HYPERLIPIDEMIA: Chronic | ICD-10-CM

## 2023-08-25 LAB
ALBUMIN SERPL BCP-MCNC: 4.1 G/DL (ref 3.5–5.2)
ALP SERPL-CCNC: 54 U/L (ref 55–135)
ALT SERPL W/O P-5'-P-CCNC: 31 U/L (ref 10–44)
ANION GAP SERPL CALC-SCNC: 6 MMOL/L (ref 8–16)
AST SERPL-CCNC: 22 U/L (ref 10–40)
BASOPHILS # BLD AUTO: 0.02 K/UL (ref 0–0.2)
BASOPHILS NFR BLD: 0.4 % (ref 0–1.9)
BILIRUB SERPL-MCNC: 0.6 MG/DL (ref 0.1–1)
BUN SERPL-MCNC: 10 MG/DL (ref 8–23)
CALCIUM SERPL-MCNC: 9.5 MG/DL (ref 8.7–10.5)
CHLORIDE SERPL-SCNC: 103 MMOL/L (ref 95–110)
CHOLEST SERPL-MCNC: 161 MG/DL (ref 120–199)
CHOLEST/HDLC SERPL: 3.1 {RATIO} (ref 2–5)
CO2 SERPL-SCNC: 29 MMOL/L (ref 23–29)
COMPLEXED PSA SERPL-MCNC: 1.6 NG/ML (ref 0–4)
CREAT SERPL-MCNC: 0.8 MG/DL (ref 0.5–1.4)
DIFFERENTIAL METHOD: NORMAL
EOSINOPHIL # BLD AUTO: 0.1 K/UL (ref 0–0.5)
EOSINOPHIL NFR BLD: 1.5 % (ref 0–8)
ERYTHROCYTE [DISTWIDTH] IN BLOOD BY AUTOMATED COUNT: 11.9 % (ref 11.5–14.5)
EST. GFR  (NO RACE VARIABLE): >60 ML/MIN/1.73 M^2
ESTIMATED AVG GLUCOSE: 111 MG/DL (ref 68–131)
GLUCOSE SERPL-MCNC: 114 MG/DL (ref 70–110)
HBA1C MFR BLD: 5.5 % (ref 4–5.6)
HCT VFR BLD AUTO: 46.3 % (ref 40–54)
HDLC SERPL-MCNC: 52 MG/DL (ref 40–75)
HDLC SERPL: 32.3 % (ref 20–50)
HGB BLD-MCNC: 15.7 G/DL (ref 14–18)
IMM GRANULOCYTES # BLD AUTO: 0.01 K/UL (ref 0–0.04)
IMM GRANULOCYTES NFR BLD AUTO: 0.2 % (ref 0–0.5)
LDLC SERPL CALC-MCNC: 88 MG/DL (ref 63–159)
LYMPHOCYTES # BLD AUTO: 2 K/UL (ref 1–4.8)
LYMPHOCYTES NFR BLD: 37.9 % (ref 18–48)
MCH RBC QN AUTO: 30.1 PG (ref 27–31)
MCHC RBC AUTO-ENTMCNC: 33.9 G/DL (ref 32–36)
MCV RBC AUTO: 89 FL (ref 82–98)
MONOCYTES # BLD AUTO: 0.6 K/UL (ref 0.3–1)
MONOCYTES NFR BLD: 10.5 % (ref 4–15)
NEUTROPHILS # BLD AUTO: 2.6 K/UL (ref 1.8–7.7)
NEUTROPHILS NFR BLD: 49.5 % (ref 38–73)
NONHDLC SERPL-MCNC: 109 MG/DL
NRBC BLD-RTO: 0 /100 WBC
PLATELET # BLD AUTO: 264 K/UL (ref 150–450)
PMV BLD AUTO: 9.5 FL (ref 9.2–12.9)
POTASSIUM SERPL-SCNC: 4.6 MMOL/L (ref 3.5–5.1)
PROT SERPL-MCNC: 7 G/DL (ref 6–8.4)
RBC # BLD AUTO: 5.21 M/UL (ref 4.6–6.2)
SODIUM SERPL-SCNC: 138 MMOL/L (ref 136–145)
TRIGL SERPL-MCNC: 105 MG/DL (ref 30–150)
WBC # BLD AUTO: 5.33 K/UL (ref 3.9–12.7)

## 2023-08-25 PROCEDURE — 84153 ASSAY OF PSA TOTAL: CPT | Performed by: INTERNAL MEDICINE

## 2023-08-25 PROCEDURE — 99999 PR PBB SHADOW E&M-EST. PATIENT-LVL III: CPT | Mod: PBBFAC,,, | Performed by: INTERNAL MEDICINE

## 2023-08-25 PROCEDURE — 85025 COMPLETE CBC W/AUTO DIFF WBC: CPT | Performed by: INTERNAL MEDICINE

## 2023-08-25 PROCEDURE — 36415 COLL VENOUS BLD VENIPUNCTURE: CPT | Mod: PO | Performed by: INTERNAL MEDICINE

## 2023-08-25 PROCEDURE — 80053 COMPREHEN METABOLIC PANEL: CPT | Performed by: INTERNAL MEDICINE

## 2023-08-25 PROCEDURE — 83036 HEMOGLOBIN GLYCOSYLATED A1C: CPT | Performed by: INTERNAL MEDICINE

## 2023-08-25 PROCEDURE — 99999 PR PBB SHADOW E&M-EST. PATIENT-LVL III: ICD-10-PCS | Mod: PBBFAC,,, | Performed by: INTERNAL MEDICINE

## 2023-08-25 PROCEDURE — 99397 PER PM REEVAL EST PAT 65+ YR: CPT | Mod: S$GLB,,, | Performed by: INTERNAL MEDICINE

## 2023-08-25 PROCEDURE — 99397 PR PREVENTIVE VISIT,EST,65 & OVER: ICD-10-PCS | Mod: S$GLB,,, | Performed by: INTERNAL MEDICINE

## 2023-08-25 PROCEDURE — 80061 LIPID PANEL: CPT | Performed by: INTERNAL MEDICINE

## 2023-08-25 RX ORDER — ROSUVASTATIN CALCIUM 5 MG/1
5 TABLET, COATED ORAL DAILY
Qty: 90 TABLET | Refills: 3 | Status: SHIPPED | OUTPATIENT
Start: 2023-08-25

## 2023-08-25 RX ORDER — OLMESARTAN MEDOXOMIL 20 MG/1
20 TABLET ORAL DAILY
Qty: 90 TABLET | Refills: 3 | Status: SHIPPED | OUTPATIENT
Start: 2023-08-25

## 2023-08-25 RX ORDER — LISINOPRIL 10 MG/1
1 TABLET ORAL DAILY
COMMUNITY
End: 2023-08-25

## 2023-08-25 NOTE — PROGRESS NOTES
Subjective:       Patient ID: Mike Ybarra is a 68 y.o. male.  Chief Complaint: Annual Exam     HPI    Here to establish care as Dr Chavarria retired.    Here for routine health maintenance.      Father had prostate cancer at 78yo.  Mother CVA/CAD at 79yo.    HLD - controlled with vegetarian diet.    HTN - controlled    Chronic low back pain - controlled with CBD/THC     Assessment:       1. Routine physical examination    2. Essential hypertension    3. Dyslipidemia    4. Encounter for screening for malignant neoplasm of prostate    5. Encounter for long-term (current) use of medications        Plan:       Routine physical examination  -     CBC Auto Differential; Future; Expected date: 08/25/2023  -     Comprehensive Metabolic Panel; Future; Expected date: 08/25/2023  -     Hemoglobin A1C; Future; Expected date: 08/25/2023  -     Lipid Panel; Future; Expected date: 08/25/2023  -     PSA, Screening; Future; Expected date: 08/25/2023    Essential hypertension  -     Comprehensive Metabolic Panel; Future; Expected date: 08/25/2023  -     olmesartan (BENICAR) 20 MG tablet; Take 1 tablet (20 mg total) by mouth once daily.  Dispense: 90 tablet; Refill: 3    Dyslipidemia  -     Lipid Panel; Future; Expected date: 08/25/2023    Encounter for screening for malignant neoplasm of prostate  -     PSA, Screening; Future; Expected date: 08/25/2023    Encounter for long-term (current) use of medications  -     CBC Auto Differential; Future; Expected date: 08/25/2023  -     Hemoglobin A1C; Future; Expected date: 08/25/2023    Mixed hyperlipidemia  -     rosuvastatin (CRESTOR) 5 MG tablet; Take 1 tablet (5 mg total) by mouth once daily.  Dispense: 90 tablet; Refill: 3              Wellness reviewed   Continue current management and monitor.  Other diagnoses were reviewed and found stable and will continue to monitor.  Counseled on regular exercise, maintenance of a healthy weight, balanced diet rich in fruits/vegetables and  lean protein, and avoidance of unhealthy habits like smoking and excessive alcohol intake.   Also, counseled on importance of being compliant with medication, health appointments, diet and exercise.     Follow up in about 1 year (around 8/25/2024).      Medication List with Changes/Refills   Current Medications    BISACODYL (DULCOLAX) 5 MG EC TABLET    As directed on prep sheet    MIRALAX 17 GRAM/DOSE POWDER    As directed on prep sheet.    TADALAFIL (CIALIS) 20 MG TAB    Take 1 tablet (20 mg total) by mouth once daily.    VASCULERA 630 MG TAB    Take 1 tablet by mouth 2 (two) times daily.   Changed and/or Refilled Medications    Modified Medication Previous Medication    OLMESARTAN (BENICAR) 20 MG TABLET olmesartan (BENICAR) 20 MG tablet       Take 1 tablet (20 mg total) by mouth once daily.    TAKE 1 TABLET(20 MG) BY MOUTH EVERY DAY    ROSUVASTATIN (CRESTOR) 5 MG TABLET rosuvastatin (CRESTOR) 5 MG tablet       Take 1 tablet (5 mg total) by mouth once daily.    TAKE 1 TABLET(5 MG) BY MOUTH EVERY DAY   Discontinued Medications    FLUTICASONE PROPIONATE (FLONASE) 50 MCG/ACTUATION NASAL SPRAY    SHAKE LIQUID AND USE 1 SPRAY(50 MCG) IN EACH NOSTRIL EVERY DAY    LAXATIVE, BISACODYL, 5 MG TAB    Take by mouth.    LISINOPRIL 10 MG TABLET    Take 1 tablet by mouth once daily.       BP Readings from Last 3 Encounters:   08/25/23 128/80   08/23/22 134/82   03/14/22 134/80     Hemoglobin A1C   Date Value Ref Range Status   08/01/2019 5.7 (H) 4.0 - 5.6 % Final     Comment:     ADA Screening Guidelines:  5.7-6.4%  Consistent with prediabetes  >or=6.5%  Consistent with diabetes  High levels of fetal hemoglobin interfere with the HbA1C  assay. Heterozygous hemoglobin variants (HbS, HgC, etc)do  not significantly interfere with this assay.   However, presence of multiple variants may affect accuracy.       Lab Results   Component Value Date    TSH 2.557 07/23/2013     Lab Results   Component Value Date    LDLCALC 82.8 08/23/2022     LDLCALC 80.6 09/22/2021    LDLCALC 135.2 06/21/2021     Lab Results   Component Value Date    TRIG 116 08/23/2022    TRIG 107 09/22/2021    TRIG 214 (H) 06/21/2021     Wt Readings from Last 3 Encounters:   08/25/23 109.6 kg (241 lb 10 oz)   08/23/22 109.4 kg (241 lb 2.9 oz)   06/02/22 111.1 kg (244 lb 14.9 oz)     Lab Results   Component Value Date    HGB 15.9 06/21/2021    HCT 47.9 06/21/2021    WBC 7.07 06/21/2021    ALT 28 08/23/2022    AST 23 08/23/2022     (L) 08/23/2022    K 3.8 08/23/2022    CREATININE 0.8 08/23/2022    PSA 1.6 08/23/2022           Review of Systems   Constitutional:  Negative for diaphoresis and fever.   HENT:  Negative for drooling and nosebleeds.    Eyes:  Negative for discharge and redness.   Respiratory:  Negative for apnea and choking.    Cardiovascular:  Negative for chest pain and palpitations.   Gastrointestinal:  Negative for abdominal pain and nausea.   Skin:  Negative for color change.   Neurological:  Negative for seizures and syncope.   Psychiatric/Behavioral:  Negative for behavioral problems.            Objective:      Vitals:    08/25/23 0733   BP: 128/80   Pulse: 74   Temp: 97.8 °F (36.6 °C)     Physical Exam  Vitals reviewed.   Eyes:      Conjunctiva/sclera: Conjunctivae normal.   Neck:      Thyroid: No thyromegaly.      Trachea: Trachea normal.   Cardiovascular:      Rate and Rhythm: Normal rate.      Comments: Edema negative  Pulmonary:      Effort: Pulmonary effort is normal.      Breath sounds: Normal breath sounds.   Abdominal:      General: Bowel sounds are normal.      Palpations: Abdomen is soft. There is no hepatomegaly.   Musculoskeletal:      Cervical back: Normal range of motion.   Skin:     General: Skin is warm and dry.   Neurological:      Mental Status: He is alert.      Comments: DTR decreased bilateral   Psychiatric:      Comments: Alert and Oriented

## 2023-10-05 ENCOUNTER — OFFICE VISIT (OUTPATIENT)
Dept: URGENT CARE | Facility: CLINIC | Age: 68
End: 2023-10-05
Payer: COMMERCIAL

## 2023-10-05 VITALS
RESPIRATION RATE: 16 BRPM | SYSTOLIC BLOOD PRESSURE: 136 MMHG | DIASTOLIC BLOOD PRESSURE: 87 MMHG | TEMPERATURE: 98 F | HEART RATE: 84 BPM | OXYGEN SATURATION: 95 % | WEIGHT: 241 LBS | BODY MASS INDEX: 29.97 KG/M2 | HEIGHT: 75 IN

## 2023-10-05 DIAGNOSIS — R09.81 NASAL CONGESTION: ICD-10-CM

## 2023-10-05 DIAGNOSIS — J06.9 VIRAL URI WITH COUGH: Primary | ICD-10-CM

## 2023-10-05 DIAGNOSIS — J02.9 SORE THROAT: ICD-10-CM

## 2023-10-05 LAB
CTP QC/QA: YES
CTP QC/QA: YES
POC MOLECULAR INFLUENZA A AGN: NEGATIVE
POC MOLECULAR INFLUENZA B AGN: NEGATIVE
SARS-COV-2 AG RESP QL IA.RAPID: NEGATIVE

## 2023-10-05 PROCEDURE — 87502 POCT INFLUENZA A/B MOLECULAR: ICD-10-PCS | Mod: QW,S$GLB,, | Performed by: NURSE PRACTITIONER

## 2023-10-05 PROCEDURE — 87811 SARS-COV-2 COVID19 W/OPTIC: CPT | Mod: QW,S$GLB,, | Performed by: NURSE PRACTITIONER

## 2023-10-05 PROCEDURE — 99203 OFFICE O/P NEW LOW 30 MIN: CPT | Mod: S$GLB,,, | Performed by: NURSE PRACTITIONER

## 2023-10-05 PROCEDURE — 87502 INFLUENZA DNA AMP PROBE: CPT | Mod: QW,S$GLB,, | Performed by: NURSE PRACTITIONER

## 2023-10-05 PROCEDURE — 87811 SARS CORONAVIRUS 2 ANTIGEN POCT, MANUAL READ: ICD-10-PCS | Mod: QW,S$GLB,, | Performed by: NURSE PRACTITIONER

## 2023-10-05 PROCEDURE — 99203 PR OFFICE/OUTPT VISIT, NEW, LEVL III, 30-44 MIN: ICD-10-PCS | Mod: S$GLB,,, | Performed by: NURSE PRACTITIONER

## 2023-10-05 RX ORDER — FLUTICASONE PROPIONATE 50 MCG
1 SPRAY, SUSPENSION (ML) NASAL DAILY PRN
Qty: 16 G | Refills: 0 | Status: SHIPPED | OUTPATIENT
Start: 2023-10-05

## 2023-10-05 RX ORDER — AZELASTINE 1 MG/ML
1 SPRAY, METERED NASAL 2 TIMES DAILY PRN
Qty: 30 ML | Refills: 0 | Status: SHIPPED | OUTPATIENT
Start: 2023-10-05

## 2023-10-05 RX ORDER — PREDNISONE 20 MG/1
20 TABLET ORAL DAILY
Qty: 4 TABLET | Refills: 0 | Status: SHIPPED | OUTPATIENT
Start: 2023-10-05 | End: 2023-10-09

## 2023-10-05 RX ORDER — PREDNISONE 20 MG/1
20 TABLET ORAL DAILY
Qty: 4 TABLET | Refills: 0 | Status: SHIPPED | OUTPATIENT
Start: 2023-10-05 | End: 2023-10-05

## 2023-10-06 NOTE — PATIENT INSTRUCTIONS
- You received a Prednisone RX today.  This can elevate your blood pressure, elevate your blood sugar, water weight gain, nervous energy, redness to the face and dimpling of the skin where the shot goes in.   - Do not use steroids more than 3 times per year.   - If you have diabetes, please check you blood sugar frequently.  - If you have high blood pressure, please check your blood pressure frequently.         INSTRUCTIONS:  - Rest.  - Drink plenty of fluids.  - Take Tylenol and/or Ibuprofen as directed as needed for fever/pain.  Do not take more than the recommended dose.  - follow up with your PCP within the next 1-2 weeks as needed.  - You must understand that you have received an Urgent Care treatment only and that you may be released before all of your medical problems are known or treated.   - You, the patient, will arrange for follow up care as instructed.   - If your condition worsens or fails to improve we recommend that you receive another evaluation at the ER immediately or contact your PCP to discuss your concerns.   - You can call [phone] or [phone] to help schedule an appointment with the appropriate provider.     -If you smoke cigarettes, it would be beneficial for you to stop.

## 2023-10-06 NOTE — PROGRESS NOTES
"Subjective:      Patient ID: Mike Ybarra is a 68 y.o. male.    Vitals:  height is 6' 3" (1.905 m) and weight is 109.3 kg (241 lb). His temporal temperature is 97.5 °F (36.4 °C). His blood pressure is 136/87 and his pulse is 84. His respiration is 16 and oxygen saturation is 95%.     Chief Complaint: Nasal Congestion    Sinus congestion, headaches, sinus pain and pressure that started "a while ago" but has "manifested over the last couple days"  Patient did take Mucinex and Tylenol every now and then, but not consistently.    Provider note begins below:  Patient denies fever, chills, cp, sob, N/V or abdominal pain.  Patient is awake and alert.  He is in no acute distress.  Afebrile.    Other  This is a new problem. The current episode started in the past 7 days. The problem occurs constantly. The problem has been gradually worsening. Associated symptoms include congestion, headaches and a sore throat. Pertinent negatives include no abdominal pain, anorexia, arthralgias, change in bowel habit, chest pain, chills, coughing, diaphoresis, fatigue, fever, joint swelling, myalgias, nausea, neck pain, numbness, rash, swollen glands, urinary symptoms, vertigo, visual change, vomiting or weakness. Treatments tried: OTC  meds. The treatment provided no relief.       Constitution: Negative. Negative for chills, sweating, fatigue and fever.   HENT:  Positive for congestion and sore throat. Negative for ear pain, ear discharge, facial swelling and sinus pressure.    Neck: Negative for neck pain, neck stiffness and painful lymph nodes.   Cardiovascular: Negative.  Negative for chest pain and sob on exertion.   Eyes: Negative.  Negative for eye itching, eye pain and eye redness.   Respiratory: Negative.  Negative for chest tightness, cough, shortness of breath, wheezing and asthma.    Gastrointestinal: Negative.  Negative for abdominal pain, nausea and vomiting.   Endocrine: negative. excessive thirst.   Genitourinary: " Negative.  Negative for dysuria, frequency, urgency and flank pain.   Musculoskeletal: Negative.  Negative for pain, trauma, joint pain, joint swelling and muscle ache.   Skin: Negative.  Negative for rash, wound, lesion and hives.   Allergic/Immunologic: Negative.  Negative for eczema, asthma, hives, itching and sneezing.   Neurological:  Positive for headaches. Negative for dizziness, history of vertigo, passing out, disorientation, altered mental status and numbness.   Hematologic/Lymphatic: Negative.  Negative for swollen lymph nodes.   Psychiatric/Behavioral: Negative.  Negative for altered mental status, disorientation and confusion.       Objective:     Physical Exam   Constitutional: He is oriented to person, place, and time. He appears well-developed. He is cooperative.  Non-toxic appearance. He does not appear ill. No distress.   HENT:   Head: Normocephalic and atraumatic.   Ears:   Right Ear: Hearing, tympanic membrane, external ear and ear canal normal. impacted cerumen  Left Ear: Hearing, tympanic membrane, external ear and ear canal normal. impacted cerumen  Nose: Congestion present. No mucosal edema, rhinorrhea or nasal deformity. No epistaxis. Right sinus exhibits no maxillary sinus tenderness and no frontal sinus tenderness. Left sinus exhibits no maxillary sinus tenderness and no frontal sinus tenderness.   Mouth/Throat: Uvula is midline and mucous membranes are normal. No trismus in the jaw. Normal dentition. No uvula swelling. Posterior oropharyngeal erythema present. No oropharyngeal exudate, posterior oropharyngeal edema, tonsillar abscesses or cobblestoning. Tonsils are 0 on the right. Tonsils are 0 on the left. No tonsillar exudate.   Patent airway.      Comments: Patent airway.  Eyes: Conjunctivae and lids are normal. No scleral icterus.   Neck: Trachea normal and phonation normal. Neck supple. No edema present. No erythema present. No neck rigidity present.   Cardiovascular: Normal rate,  regular rhythm, normal heart sounds and normal pulses.   Pulmonary/Chest: Effort normal and breath sounds normal. No stridor. No respiratory distress. He has no decreased breath sounds. He has no wheezes. He has no rhonchi. He has no rales. He exhibits no tenderness.   Abdominal: Normal appearance. flat abdomen There is no abdominal tenderness.   Musculoskeletal: Normal range of motion.         General: No deformity. Normal range of motion.   Lymphadenopathy:     He has no cervical adenopathy.   Neurological: no focal deficit. He is alert, oriented to person, place, and time and at baseline. He exhibits normal muscle tone. Coordination normal.   Skin: Skin is warm, dry, intact, not diaphoretic and not pale.   Psychiatric: His speech is normal and behavior is normal. Mood, judgment and thought content normal.   Nursing note and vitals reviewed.  The following results have been reviewed with the patient:  LABS-  Results for orders placed or performed in visit on 10/05/23   SARS Coronavirus 2 Antigen, POCT Manual Read   Result Value Ref Range    SARS Coronavirus 2 Antigen Negative Negative     Acceptable Yes    POCT Influenza A/B MOLECULAR   Result Value Ref Range    POC Molecular Influenza A Ag Negative Negative, Not Reported    POC Molecular Influenza B Ag Negative Negative, Not Reported     Acceptable Yes         IMAGING-  No results found.    Assessment:     1. Viral URI with cough    2. Nasal congestion    3. Sore throat        Plan:   FOLLOWUP  Follow up if symptoms worsen or fail to improve, for PLEASE CONTACT PCP OR CONTACT THE EMERGENCY ROOM..     PATIENT INSTRUCTIONS  Patient Instructions   - You received a Prednisone RX today.  This can elevate your blood pressure, elevate your blood sugar, water weight gain, nervous energy, redness to the face and dimpling of the skin where the shot goes in.   - Do not use steroids more than 3 times per year.   - If you have diabetes, please  check you blood sugar frequently.  - If you have high blood pressure, please check your blood pressure frequently.         INSTRUCTIONS:  - Rest.  - Drink plenty of fluids.  - Take Tylenol and/or Ibuprofen as directed as needed for fever/pain.  Do not take more than the recommended dose.  - follow up with your PCP within the next 1-2 weeks as needed.  - You must understand that you have received an Urgent Care treatment only and that you may be released before all of your medical problems are known or treated.   - You, the patient, will arrange for follow up care as instructed.   - If your condition worsens or fails to improve we recommend that you receive another evaluation at the ER immediately or contact your PCP to discuss your concerns.   - You can call [phone] or [phone] to help schedule an appointment with the appropriate provider.     -If you smoke cigarettes, it would be beneficial for you to stop.         THANK YOU FOR ALLOWING ME TO PARTICIPATE IN YOUR HEALTHCARE,     Al Ferrari, NP     Viral URI with cough  -     fluticasone propionate (FLONASE) 50 mcg/actuation nasal spray; 1 spray (50 mcg total) by Each Nostril route daily as needed for Rhinitis or Allergies.  Dispense: 16 g; Refill: 0  -     azelastine (ASTELIN) 137 mcg (0.1 %) nasal spray; 1 spray (137 mcg total) by Nasal route 2 (two) times daily as needed for Rhinitis.  Dispense: 30 mL; Refill: 0  -     Discontinue: predniSONE (DELTASONE) 20 MG tablet; Take 1 tablet (20 mg total) by mouth once daily. for 4 days  Dispense: 4 tablet; Refill: 0  -     predniSONE (DELTASONE) 20 MG tablet; Take 1 tablet (20 mg total) by mouth once daily. for 4 days  Dispense: 4 tablet; Refill: 0    Nasal congestion  -     SARS Coronavirus 2 Antigen, POCT Manual Read  -     POCT Influenza A/B MOLECULAR    Sore throat  -     Discontinue: predniSONE (DELTASONE) 20 MG tablet; Take 1 tablet (20 mg total) by mouth once daily. for 4 days  Dispense: 4  tablet; Refill: 0  -     predniSONE (DELTASONE) 20 MG tablet; Take 1 tablet (20 mg total) by mouth once daily. for 4 days  Dispense: 4 tablet; Refill: 0

## 2024-06-12 ENCOUNTER — OFFICE VISIT (OUTPATIENT)
Dept: FAMILY MEDICINE | Facility: CLINIC | Age: 69
End: 2024-06-12
Payer: COMMERCIAL

## 2024-06-12 DIAGNOSIS — I10 ESSENTIAL HYPERTENSION: ICD-10-CM

## 2024-06-12 DIAGNOSIS — B00.1 FEVER BLISTER: Primary | ICD-10-CM

## 2024-06-12 DIAGNOSIS — Z91.09 ENVIRONMENTAL ALLERGIES: ICD-10-CM

## 2024-06-12 DIAGNOSIS — E78.2 MIXED HYPERLIPIDEMIA: Chronic | ICD-10-CM

## 2024-06-12 PROCEDURE — 99214 OFFICE O/P EST MOD 30 MIN: CPT | Mod: 95,,, | Performed by: NURSE PRACTITIONER

## 2024-06-12 RX ORDER — FLUTICASONE PROPIONATE 50 MCG
1 SPRAY, SUSPENSION (ML) NASAL DAILY PRN
Qty: 16 G | Refills: 0 | Status: SHIPPED | OUTPATIENT
Start: 2024-06-12

## 2024-06-12 RX ORDER — OLMESARTAN MEDOXOMIL 20 MG/1
20 TABLET ORAL DAILY
Qty: 90 TABLET | Refills: 3 | Status: SHIPPED | OUTPATIENT
Start: 2024-06-12

## 2024-06-12 RX ORDER — ROSUVASTATIN CALCIUM 5 MG/1
5 TABLET, COATED ORAL DAILY
Qty: 90 TABLET | Refills: 3 | Status: SHIPPED | OUTPATIENT
Start: 2024-06-12

## 2024-06-12 RX ORDER — VALACYCLOVIR HYDROCHLORIDE 1 G/1
2000 TABLET, FILM COATED ORAL EVERY 12 HOURS
Qty: 30 TABLET | Refills: 2 | Status: SHIPPED | OUTPATIENT
Start: 2024-06-12 | End: 2024-06-13

## 2024-06-12 NOTE — PROGRESS NOTES
Subjective:       Patient ID: Mike Ybarra is a 69 y.o. male.    The patient location is: [place]  The chief complaint leading to consultation is: med refill    Visit type: audiovisual    Face to Face time with patient: 10min  15 minutes of total time spent on the encounter, which includes face to face time and non-face to face time preparing to see the patient (eg, review of tests), Obtaining and/or reviewing separately obtained history, Documenting clinical information in the electronic or other health record, Independently interpreting results (not separately reported) and communicating results to the patient/family/caregiver, or Care coordination (not separately reported).     Each patient to whom he or she provides medical services by telemedicine is:  (1) informed of the relationship between the physician and patient and the respective role of any other health care provider with respect to management of the patient; and (2) notified that he or she may decline to receive medical services by telemedicine and may withdraw from such care at any time.    HPI  New patient to me presents for medication refills    Fever blisters: using valtrex prn.    HTN on olmesartan  HLD on statin    Allergies: flonase helps      Review of Systems   Constitutional:  Negative for activity change and unexpected weight change.   HENT:  Negative for hearing loss, rhinorrhea and trouble swallowing.    Eyes:  Negative for discharge and visual disturbance.   Respiratory:  Negative for chest tightness and wheezing.    Cardiovascular:  Negative for chest pain and palpitations.   Gastrointestinal:  Negative for blood in stool, constipation, diarrhea and vomiting.   Endocrine: Negative for polydipsia and polyuria.   Genitourinary:  Negative for difficulty urinating, hematuria and urgency.   Musculoskeletal:  Negative for arthralgias, joint swelling and neck pain.   Neurological:  Negative for weakness and headaches.    Psychiatric/Behavioral:  Negative for confusion and dysphoric mood.          Objective:      Physical Exam  Constitutional:       General: He is not in acute distress.     Appearance: He is well-developed. He is not ill-appearing, toxic-appearing or diaphoretic.   HENT:      Right Ear: Hearing normal.      Left Ear: Hearing normal.   Pulmonary:      Effort: No tachypnea or respiratory distress.   Skin:     Coloration: Skin is not pale.   Neurological:      Mental Status: He is alert and oriented to person, place, and time.   Psychiatric:         Speech: Speech normal.         Behavior: Behavior normal.         Thought Content: Thought content normal.         Judgment: Judgment normal.         Assessment:       1. Fever blister    2. Essential hypertension    3. Mixed hyperlipidemia    4. Environmental allergies        Plan:       Fever blister  -     valACYclovir (VALTREX) 1000 MG tablet; Take 2 tablets (2,000 mg total) by mouth every 12 (twelve) hours. for 1 day  Dispense: 30 tablet; Refill: 2    Essential hypertension  -     olmesartan (BENICAR) 20 MG tablet; Take 1 tablet (20 mg total) by mouth once daily.  Dispense: 90 tablet; Refill: 3    Mixed hyperlipidemia  -     rosuvastatin (CRESTOR) 5 MG tablet; Take 1 tablet (5 mg total) by mouth once daily.  Dispense: 90 tablet; Refill: 3    Environmental allergies  -     fluticasone propionate (FLONASE) 50 mcg/actuation nasal spray; 1 spray (50 mcg total) by Each Nostril route daily as needed for Rhinitis or Allergies.  Dispense: 16 g; Refill: 0        FU 2 months with PCP as scheduled for annual         Medication List with Changes/Refills   New Medications    VALACYCLOVIR (VALTREX) 1000 MG TABLET    Take 2 tablets (2,000 mg total) by mouth every 12 (twelve) hours. for 1 day   Current Medications    AZELASTINE (ASTELIN) 137 MCG (0.1 %) NASAL SPRAY    1 spray (137 mcg total) by Nasal route 2 (two) times daily as needed for Rhinitis.    BISACODYL (DULCOLAX) 5 MG EC  TABLET    As directed on prep sheet    MIRALAX 17 GRAM/DOSE POWDER    As directed on prep sheet.    TADALAFIL (CIALIS) 20 MG TAB    Take 1 tablet (20 mg total) by mouth once daily.    VASCULERA 630 MG TAB    Take 1 tablet by mouth 2 (two) times daily.   Changed and/or Refilled Medications    Modified Medication Previous Medication    FLUTICASONE PROPIONATE (FLONASE) 50 MCG/ACTUATION NASAL SPRAY fluticasone propionate (FLONASE) 50 mcg/actuation nasal spray       1 spray (50 mcg total) by Each Nostril route daily as needed for Rhinitis or Allergies.    1 spray (50 mcg total) by Each Nostril route daily as needed for Rhinitis or Allergies.    OLMESARTAN (BENICAR) 20 MG TABLET olmesartan (BENICAR) 20 MG tablet       Take 1 tablet (20 mg total) by mouth once daily.    Take 1 tablet (20 mg total) by mouth once daily.    ROSUVASTATIN (CRESTOR) 5 MG TABLET rosuvastatin (CRESTOR) 5 MG tablet       Take 1 tablet (5 mg total) by mouth once daily.    Take 1 tablet (5 mg total) by mouth once daily.

## 2024-08-23 ENCOUNTER — OFFICE VISIT (OUTPATIENT)
Dept: FAMILY MEDICINE | Facility: CLINIC | Age: 69
End: 2024-08-23
Payer: COMMERCIAL

## 2024-08-23 ENCOUNTER — LAB VISIT (OUTPATIENT)
Dept: LAB | Facility: HOSPITAL | Age: 69
End: 2024-08-23
Attending: INTERNAL MEDICINE
Payer: COMMERCIAL

## 2024-08-23 VITALS
BODY MASS INDEX: 29.9 KG/M2 | HEART RATE: 87 BPM | WEIGHT: 240.5 LBS | HEIGHT: 75 IN | SYSTOLIC BLOOD PRESSURE: 134 MMHG | TEMPERATURE: 98 F | OXYGEN SATURATION: 95 % | DIASTOLIC BLOOD PRESSURE: 86 MMHG

## 2024-08-23 DIAGNOSIS — Z00.00 ROUTINE PHYSICAL EXAMINATION: Primary | ICD-10-CM

## 2024-08-23 DIAGNOSIS — I10 ESSENTIAL HYPERTENSION: ICD-10-CM

## 2024-08-23 DIAGNOSIS — Z00.00 ROUTINE PHYSICAL EXAMINATION: ICD-10-CM

## 2024-08-23 DIAGNOSIS — R73.03 PREDIABETES: ICD-10-CM

## 2024-08-23 DIAGNOSIS — E78.2 MIXED HYPERLIPIDEMIA: ICD-10-CM

## 2024-08-23 LAB
ALBUMIN SERPL BCP-MCNC: 4.1 G/DL (ref 3.5–5.2)
ALP SERPL-CCNC: 59 U/L (ref 55–135)
ALT SERPL W/O P-5'-P-CCNC: 28 U/L (ref 10–44)
ANION GAP SERPL CALC-SCNC: 8 MMOL/L (ref 8–16)
AST SERPL-CCNC: 20 U/L (ref 10–40)
BASOPHILS # BLD AUTO: 0.03 K/UL (ref 0–0.2)
BASOPHILS NFR BLD: 0.5 % (ref 0–1.9)
BILIRUB SERPL-MCNC: 0.6 MG/DL (ref 0.1–1)
BUN SERPL-MCNC: 13 MG/DL (ref 8–23)
CALCIUM SERPL-MCNC: 9.5 MG/DL (ref 8.7–10.5)
CHLORIDE SERPL-SCNC: 104 MMOL/L (ref 95–110)
CHOLEST SERPL-MCNC: 168 MG/DL (ref 120–199)
CHOLEST/HDLC SERPL: 3.4 {RATIO} (ref 2–5)
CO2 SERPL-SCNC: 25 MMOL/L (ref 23–29)
COMPLEXED PSA SERPL-MCNC: 1.9 NG/ML (ref 0–4)
CREAT SERPL-MCNC: 0.9 MG/DL (ref 0.5–1.4)
DIFFERENTIAL METHOD BLD: NORMAL
EOSINOPHIL # BLD AUTO: 0.1 K/UL (ref 0–0.5)
EOSINOPHIL NFR BLD: 2.2 % (ref 0–8)
ERYTHROCYTE [DISTWIDTH] IN BLOOD BY AUTOMATED COUNT: 12 % (ref 11.5–14.5)
EST. GFR  (NO RACE VARIABLE): >60 ML/MIN/1.73 M^2
ESTIMATED AVG GLUCOSE: 114 MG/DL (ref 68–131)
GLUCOSE SERPL-MCNC: 104 MG/DL (ref 70–110)
HBA1C MFR BLD: 5.6 % (ref 4–5.6)
HCT VFR BLD AUTO: 44.9 % (ref 40–54)
HDLC SERPL-MCNC: 50 MG/DL (ref 40–75)
HDLC SERPL: 29.8 % (ref 20–50)
HGB BLD-MCNC: 15.5 G/DL (ref 14–18)
IMM GRANULOCYTES # BLD AUTO: 0.02 K/UL (ref 0–0.04)
IMM GRANULOCYTES NFR BLD AUTO: 0.3 % (ref 0–0.5)
LDLC SERPL CALC-MCNC: 92.8 MG/DL (ref 63–159)
LYMPHOCYTES # BLD AUTO: 2.2 K/UL (ref 1–4.8)
LYMPHOCYTES NFR BLD: 37.4 % (ref 18–48)
MCH RBC QN AUTO: 30.6 PG (ref 27–31)
MCHC RBC AUTO-ENTMCNC: 34.5 G/DL (ref 32–36)
MCV RBC AUTO: 89 FL (ref 82–98)
MONOCYTES # BLD AUTO: 0.6 K/UL (ref 0.3–1)
MONOCYTES NFR BLD: 9.9 % (ref 4–15)
NEUTROPHILS # BLD AUTO: 3 K/UL (ref 1.8–7.7)
NEUTROPHILS NFR BLD: 49.7 % (ref 38–73)
NONHDLC SERPL-MCNC: 118 MG/DL
NRBC BLD-RTO: 0 /100 WBC
PLATELET # BLD AUTO: 250 K/UL (ref 150–450)
PMV BLD AUTO: 9.3 FL (ref 9.2–12.9)
POTASSIUM SERPL-SCNC: 4 MMOL/L (ref 3.5–5.1)
PROT SERPL-MCNC: 7 G/DL (ref 6–8.4)
RBC # BLD AUTO: 5.06 M/UL (ref 4.6–6.2)
SODIUM SERPL-SCNC: 137 MMOL/L (ref 136–145)
TRIGL SERPL-MCNC: 126 MG/DL (ref 30–150)
WBC # BLD AUTO: 5.94 K/UL (ref 3.9–12.7)

## 2024-08-23 PROCEDURE — 83036 HEMOGLOBIN GLYCOSYLATED A1C: CPT | Performed by: INTERNAL MEDICINE

## 2024-08-23 PROCEDURE — 99999 PR PBB SHADOW E&M-EST. PATIENT-LVL III: CPT | Mod: PBBFAC,,, | Performed by: INTERNAL MEDICINE

## 2024-08-23 PROCEDURE — 80061 LIPID PANEL: CPT | Performed by: INTERNAL MEDICINE

## 2024-08-23 PROCEDURE — 85025 COMPLETE CBC W/AUTO DIFF WBC: CPT | Performed by: INTERNAL MEDICINE

## 2024-08-23 PROCEDURE — 36415 COLL VENOUS BLD VENIPUNCTURE: CPT | Mod: PO | Performed by: INTERNAL MEDICINE

## 2024-08-23 PROCEDURE — 80053 COMPREHEN METABOLIC PANEL: CPT | Performed by: INTERNAL MEDICINE

## 2024-08-23 PROCEDURE — 84153 ASSAY OF PSA TOTAL: CPT | Performed by: INTERNAL MEDICINE

## 2024-08-23 NOTE — PROGRESS NOTES
Subjective:       Patient ID: Mike Ybarra is a 69 y.o. male.  Chief Complaint: Annual Exam (Left foot feels flat )     HPI    Here for routine health maintenance.       Father had prostate cancer at 76yo.  Mother CVA/CAD at 79yo.     HLD - controlled with vegetarian diet.     HTN - controlled     Chronic low back pain - controlled with CBD/THC     Left 2-3rd distal toes intermittent numbness.  Improved x 2 days post massage.  Discussed possible w/u - emg, back etc; patient wants to wait.       Assessment:       1. Routine physical examination    2. Essential hypertension    3. Mixed hyperlipidemia    4. Prediabetes        Plan:       Routine physical examination  -     CBC Auto Differential; Future; Expected date: 08/23/2024  -     Comprehensive Metabolic Panel; Future; Expected date: 08/23/2024  -     Lipid Panel; Future; Expected date: 08/23/2024  -     PSA, Screening; Future; Expected date: 08/23/2024  -     Hemoglobin A1C; Future; Expected date: 08/23/2024  -     CBC Auto Differential; Future; Expected date: 08/23/2024  -     Comprehensive Metabolic Panel; Future; Expected date: 08/23/2024  -     Hemoglobin A1C; Future; Expected date: 08/23/2024  -     Lipid Panel; Future; Expected date: 08/23/2024  -     PSA, Screening; Future; Expected date: 08/23/2024    Essential hypertension    Mixed hyperlipidemia    Prediabetes          Wellness reviewed     Continue current management and monitor.  Other diagnoses were reviewed and found stable and will continue to monitor.  Counseled on regular exercise, maintenance of a healthy weight, balanced diet rich in fruits/vegetables and lean protein, and avoidance of unhealthy habits like smoking and excessive alcohol intake.   Also, counseled on importance of being compliant with medication, health appointments, diet and exercise.     Follow up in about 1 year (around 8/23/2025).  labs      Medication List with Changes/Refills   Current Medications    AZELASTINE  (ASTELIN) 137 MCG (0.1 %) NASAL SPRAY    1 spray (137 mcg total) by Nasal route 2 (two) times daily as needed for Rhinitis.    BISACODYL (DULCOLAX) 5 MG EC TABLET    As directed on prep sheet    FLUTICASONE PROPIONATE (FLONASE) 50 MCG/ACTUATION NASAL SPRAY    1 spray (50 mcg total) by Each Nostril route daily as needed for Rhinitis or Allergies.    MIRALAX 17 GRAM/DOSE POWDER    As directed on prep sheet.    OLMESARTAN (BENICAR) 20 MG TABLET    Take 1 tablet (20 mg total) by mouth once daily.    ROSUVASTATIN (CRESTOR) 5 MG TABLET    Take 1 tablet (5 mg total) by mouth once daily.    TADALAFIL (CIALIS) 20 MG TAB    Take 1 tablet (20 mg total) by mouth once daily.    VALACYCLOVIR (VALTREX) 1000 MG TABLET    Take 2 tablets (2,000 mg total) by mouth every 12 (twelve) hours. for 1 day    VASCULERA 630 MG TAB    Take 1 tablet by mouth 2 (two) times daily.       BP Readings from Last 3 Encounters:   08/23/24 134/86   10/05/23 136/87   08/25/23 128/80     Hemoglobin A1C   Date Value Ref Range Status   08/25/2023 5.5 4.0 - 5.6 % Final     Comment:     ADA Screening Guidelines:  5.7-6.4%  Consistent with prediabetes  >or=6.5%  Consistent with diabetes    High levels of fetal hemoglobin interfere with the HbA1C  assay. Heterozygous hemoglobin variants (HbS, HgC, etc)do  not significantly interfere with this assay.   However, presence of multiple variants may affect accuracy.     08/01/2019 5.7 (H) 4.0 - 5.6 % Final     Comment:     ADA Screening Guidelines:  5.7-6.4%  Consistent with prediabetes  >or=6.5%  Consistent with diabetes  High levels of fetal hemoglobin interfere with the HbA1C  assay. Heterozygous hemoglobin variants (HbS, HgC, etc)do  not significantly interfere with this assay.   However, presence of multiple variants may affect accuracy.       Lab Results   Component Value Date    TSH 2.557 07/23/2013     Lab Results   Component Value Date    LDLCALC 88.0 08/25/2023    LDLCALC 82.8 08/23/2022    LDLCALC 80.6  09/22/2021     Lab Results   Component Value Date    TRIG 105 08/25/2023    TRIG 116 08/23/2022    TRIG 107 09/22/2021     Wt Readings from Last 3 Encounters:   08/23/24 109.1 kg (240 lb 8.4 oz)   10/05/23 109.3 kg (241 lb)   08/25/23 109.6 kg (241 lb 10 oz)     Lab Results   Component Value Date    HGB 15.7 08/25/2023    HCT 46.3 08/25/2023    WBC 5.33 08/25/2023    ALT 31 08/25/2023    AST 22 08/25/2023     08/25/2023    K 4.6 08/25/2023    CREATININE 0.8 08/25/2023    PSA 1.6 08/25/2023           Review of Systems   Constitutional:  Negative for diaphoresis and fever.   HENT:  Negative for drooling and nosebleeds.    Eyes:  Negative for discharge and redness.   Respiratory:  Negative for apnea and choking.    Cardiovascular:  Negative for chest pain and palpitations.   Gastrointestinal:  Negative for abdominal pain and nausea.   Skin:  Negative for color change.   Neurological:  Negative for seizures and syncope.   Psychiatric/Behavioral:  Negative for behavioral problems.            Objective:      Vitals:    08/23/24 1020   BP: 134/86   Pulse: 87   Temp: 98.1 °F (36.7 °C)     Physical Exam  Vitals reviewed.   Eyes:      Conjunctiva/sclera: Conjunctivae normal.   Neck:      Thyroid: No thyromegaly.      Trachea: Trachea normal.   Cardiovascular:      Rate and Rhythm: Normal rate and regular rhythm.      Comments: Edema negative  Pulmonary:      Effort: Pulmonary effort is normal.      Breath sounds: Normal breath sounds.   Abdominal:      General: Bowel sounds are normal.      Palpations: Abdomen is soft. There is no hepatomegaly.   Musculoskeletal:      Cervical back: Normal range of motion.      Comments: ROM normal bilateral  Strength normal bilateral   Skin:     General: Skin is warm and dry.   Neurological:      Deep Tendon Reflexes: Reflexes are normal and symmetric.   Psychiatric:      Comments: Alert and Oriented

## 2024-11-26 DIAGNOSIS — E78.2 MIXED HYPERLIPIDEMIA: Chronic | ICD-10-CM

## 2024-11-26 RX ORDER — ROSUVASTATIN CALCIUM 5 MG/1
5 TABLET, COATED ORAL
Qty: 90 TABLET | Refills: 2 | Status: SHIPPED | OUTPATIENT
Start: 2024-11-26

## 2024-11-26 NOTE — TELEPHONE ENCOUNTER
No care due was identified.  [facility] Embedded Care Due Messages. Reference number: [id number].   11/26/2024 5:23:47 AM CST

## 2024-11-26 NOTE — TELEPHONE ENCOUNTER
Refill Decision Note   Mike Ybarra  is requesting a refill authorization.  Brief Assessment and Rationale for Refill:  Approve     Medication Therapy Plan:         Comments:     Note composed:11:45 AM 11/26/2024

## 2025-06-30 DIAGNOSIS — I10 ESSENTIAL HYPERTENSION: ICD-10-CM

## 2025-06-30 RX ORDER — OLMESARTAN MEDOXOMIL 20 MG/1
TABLET ORAL
Qty: 90 TABLET | Refills: 0 | Status: SHIPPED | OUTPATIENT
Start: 2025-06-30

## 2025-07-01 NOTE — TELEPHONE ENCOUNTER
Refill Decision Note   Mike Ybarra  is requesting a refill authorization.  Brief Assessment and Rationale for Refill:  Approve     Medication Therapy Plan:        Comments:     Note composed:9:02 PM 06/30/2025

## 2025-07-07 ENCOUNTER — PATIENT MESSAGE (OUTPATIENT)
Dept: FAMILY MEDICINE | Facility: CLINIC | Age: 70
End: 2025-07-07
Payer: COMMERCIAL

## 2025-08-19 ENCOUNTER — LAB VISIT (OUTPATIENT)
Dept: LAB | Facility: HOSPITAL | Age: 70
End: 2025-08-19
Attending: INTERNAL MEDICINE
Payer: COMMERCIAL

## 2025-08-19 DIAGNOSIS — Z00.00 ROUTINE PHYSICAL EXAMINATION: ICD-10-CM

## 2025-08-19 LAB
ABSOLUTE EOSINOPHIL (OHS): 0.12 K/UL
ABSOLUTE MONOCYTE (OHS): 0.58 K/UL (ref 0.3–1)
ABSOLUTE NEUTROPHIL COUNT (OHS): 2.71 K/UL (ref 1.8–7.7)
ALBUMIN SERPL BCP-MCNC: 4.1 G/DL (ref 3.5–5.2)
ALP SERPL-CCNC: 57 UNIT/L (ref 40–150)
ALT SERPL W/O P-5'-P-CCNC: 33 UNIT/L (ref 0–55)
ANION GAP (OHS): 7 MMOL/L (ref 8–16)
AST SERPL-CCNC: 33 UNIT/L (ref 0–50)
BASOPHILS # BLD AUTO: 0.03 K/UL
BASOPHILS NFR BLD AUTO: 0.5 %
BILIRUB SERPL-MCNC: 0.6 MG/DL (ref 0.1–1)
BUN SERPL-MCNC: 14 MG/DL (ref 8–23)
CALCIUM SERPL-MCNC: 9.1 MG/DL (ref 8.7–10.5)
CHLORIDE SERPL-SCNC: 105 MMOL/L (ref 95–110)
CHOLEST SERPL-MCNC: 203 MG/DL (ref 120–199)
CHOLEST/HDLC SERPL: 3.7 {RATIO} (ref 2–5)
CO2 SERPL-SCNC: 26 MMOL/L (ref 23–29)
CREAT SERPL-MCNC: 0.9 MG/DL (ref 0.5–1.4)
EAG (OHS): 120 MG/DL (ref 68–131)
ERYTHROCYTE [DISTWIDTH] IN BLOOD BY AUTOMATED COUNT: 12.3 % (ref 11.5–14.5)
GFR SERPLBLD CREATININE-BSD FMLA CKD-EPI: >60 ML/MIN/1.73/M2
GLUCOSE SERPL-MCNC: 111 MG/DL (ref 70–110)
HBA1C MFR BLD: 5.8 % (ref 4–5.6)
HCT VFR BLD AUTO: 45.5 % (ref 40–54)
HDLC SERPL-MCNC: 55 MG/DL (ref 40–75)
HDLC SERPL: 27.1 % (ref 20–50)
HGB BLD-MCNC: 15.6 GM/DL (ref 14–18)
IMM GRANULOCYTES # BLD AUTO: 0.02 K/UL (ref 0–0.04)
IMM GRANULOCYTES NFR BLD AUTO: 0.3 % (ref 0–0.5)
LDLC SERPL CALC-MCNC: 122.4 MG/DL (ref 63–159)
LYMPHOCYTES # BLD AUTO: 2.4 K/UL (ref 1–4.8)
MCH RBC QN AUTO: 30.4 PG (ref 27–31)
MCHC RBC AUTO-ENTMCNC: 34.3 G/DL (ref 32–36)
MCV RBC AUTO: 89 FL (ref 82–98)
NONHDLC SERPL-MCNC: 148 MG/DL
NUCLEATED RBC (/100WBC) (OHS): 0 /100 WBC
PLATELET # BLD AUTO: 247 K/UL (ref 150–450)
PMV BLD AUTO: 9.2 FL (ref 9.2–12.9)
POTASSIUM SERPL-SCNC: 4.6 MMOL/L (ref 3.5–5.1)
PROT SERPL-MCNC: 6.9 GM/DL (ref 6–8.4)
PSA SERPL-MCNC: 2.13 NG/ML
RBC # BLD AUTO: 5.14 M/UL (ref 4.6–6.2)
RELATIVE EOSINOPHIL (OHS): 2 %
RELATIVE LYMPHOCYTE (OHS): 41 % (ref 18–48)
RELATIVE MONOCYTE (OHS): 9.9 % (ref 4–15)
RELATIVE NEUTROPHIL (OHS): 46.3 % (ref 38–73)
SODIUM SERPL-SCNC: 138 MMOL/L (ref 136–145)
TRIGL SERPL-MCNC: 128 MG/DL (ref 30–150)
WBC # BLD AUTO: 5.86 K/UL (ref 3.9–12.7)

## 2025-08-19 PROCEDURE — 85025 COMPLETE CBC W/AUTO DIFF WBC: CPT

## 2025-08-19 PROCEDURE — 82040 ASSAY OF SERUM ALBUMIN: CPT

## 2025-08-19 PROCEDURE — 84153 ASSAY OF PSA TOTAL: CPT

## 2025-08-19 PROCEDURE — 36415 COLL VENOUS BLD VENIPUNCTURE: CPT | Mod: PO

## 2025-08-19 PROCEDURE — 83036 HEMOGLOBIN GLYCOSYLATED A1C: CPT

## 2025-08-19 PROCEDURE — 83718 ASSAY OF LIPOPROTEIN: CPT

## 2025-08-26 ENCOUNTER — PATIENT MESSAGE (OUTPATIENT)
Dept: FAMILY MEDICINE | Facility: CLINIC | Age: 70
End: 2025-08-26

## 2025-08-26 ENCOUNTER — OFFICE VISIT (OUTPATIENT)
Dept: FAMILY MEDICINE | Facility: CLINIC | Age: 70
End: 2025-08-26
Payer: COMMERCIAL

## 2025-08-26 VITALS
DIASTOLIC BLOOD PRESSURE: 82 MMHG | BODY MASS INDEX: 30.73 KG/M2 | TEMPERATURE: 98 F | HEIGHT: 74 IN | SYSTOLIC BLOOD PRESSURE: 130 MMHG | OXYGEN SATURATION: 96 % | HEART RATE: 84 BPM | WEIGHT: 239.44 LBS

## 2025-08-26 DIAGNOSIS — Z00.00 ROUTINE PHYSICAL EXAMINATION: Primary | ICD-10-CM

## 2025-08-26 DIAGNOSIS — E66.811 OBESITY (BMI 30.0-34.9): ICD-10-CM

## 2025-08-26 DIAGNOSIS — E78.2 MIXED HYPERLIPIDEMIA: ICD-10-CM

## 2025-08-26 DIAGNOSIS — I10 ESSENTIAL HYPERTENSION: ICD-10-CM

## 2025-08-26 DIAGNOSIS — R73.03 PREDIABETES: ICD-10-CM

## 2025-08-26 PROCEDURE — 99999 PR PBB SHADOW E&M-EST. PATIENT-LVL IV: CPT | Mod: PBBFAC,,, | Performed by: INTERNAL MEDICINE

## 2025-08-26 RX ORDER — PHENTERMINE HYDROCHLORIDE 37.5 MG/1
37.5 TABLET ORAL
Qty: 30 TABLET | Refills: 0 | Status: SHIPPED | OUTPATIENT
Start: 2025-08-26 | End: 2025-09-25

## 2025-09-05 DIAGNOSIS — E78.2 MIXED HYPERLIPIDEMIA: Chronic | ICD-10-CM

## 2025-09-05 RX ORDER — ROSUVASTATIN CALCIUM 5 MG/1
5 TABLET, COATED ORAL DAILY
Qty: 90 TABLET | Refills: 3 | Status: SHIPPED | OUTPATIENT
Start: 2025-09-05